# Patient Record
Sex: MALE
[De-identification: names, ages, dates, MRNs, and addresses within clinical notes are randomized per-mention and may not be internally consistent; named-entity substitution may affect disease eponyms.]

---

## 2022-05-29 ENCOUNTER — HOSPITAL ENCOUNTER (INPATIENT)
Dept: HOSPITAL 97 - ER | Age: 67
LOS: 2 days | Discharge: HOME | DRG: 812 | End: 2022-05-31
Attending: INTERNAL MEDICINE | Admitting: INTERNAL MEDICINE
Payer: COMMERCIAL

## 2022-05-29 VITALS — BODY MASS INDEX: 31 KG/M2

## 2022-05-29 DIAGNOSIS — I65.29: ICD-10-CM

## 2022-05-29 DIAGNOSIS — Z79.01: ICD-10-CM

## 2022-05-29 DIAGNOSIS — I25.119: ICD-10-CM

## 2022-05-29 DIAGNOSIS — E11.42: ICD-10-CM

## 2022-05-29 DIAGNOSIS — D62: Primary | ICD-10-CM

## 2022-05-29 DIAGNOSIS — I73.9: ICD-10-CM

## 2022-05-29 DIAGNOSIS — Z86.73: ICD-10-CM

## 2022-05-29 DIAGNOSIS — D69.6: ICD-10-CM

## 2022-05-29 DIAGNOSIS — I25.2: ICD-10-CM

## 2022-05-29 DIAGNOSIS — I48.19: ICD-10-CM

## 2022-05-29 DIAGNOSIS — Z95.1: ICD-10-CM

## 2022-05-29 DIAGNOSIS — Z20.822: ICD-10-CM

## 2022-05-29 DIAGNOSIS — E86.9: ICD-10-CM

## 2022-05-29 DIAGNOSIS — E11.319: ICD-10-CM

## 2022-05-29 DIAGNOSIS — E78.5: ICD-10-CM

## 2022-05-29 DIAGNOSIS — I10: ICD-10-CM

## 2022-05-29 LAB
BUN BLD-MCNC: 33 MG/DL (ref 7–18)
GLUCOSE SERPLBLD-MCNC: 215 MG/DL (ref 74–106)
HCT VFR BLD CALC: 25.3 % (ref 39.6–49)
LYMPHOCYTES # SPEC AUTO: 1 K/UL (ref 0.7–4.9)
PMV BLD: 9.1 FL (ref 7.6–11.3)
POTASSIUM SERPL-SCNC: 3.9 MMOL/L (ref 3.5–5.1)
RBC # BLD: 2.75 M/UL (ref 4.33–5.43)
TROPONIN I SERPL HS-MCNC: 22.7 PG/ML (ref ?–58.9)

## 2022-05-29 PROCEDURE — 93005 ELECTROCARDIOGRAM TRACING: CPT

## 2022-05-29 PROCEDURE — 82747 ASSAY OF FOLIC ACID RBC: CPT

## 2022-05-29 PROCEDURE — 83880 ASSAY OF NATRIURETIC PEPTIDE: CPT

## 2022-05-29 PROCEDURE — 96374 THER/PROPH/DIAG INJ IV PUSH: CPT

## 2022-05-29 PROCEDURE — 86901 BLOOD TYPING SEROLOGIC RH(D): CPT

## 2022-05-29 PROCEDURE — 80048 BASIC METABOLIC PNL TOTAL CA: CPT

## 2022-05-29 PROCEDURE — 82728 ASSAY OF FERRITIN: CPT

## 2022-05-29 PROCEDURE — 36415 COLL VENOUS BLD VENIPUNCTURE: CPT

## 2022-05-29 PROCEDURE — 86850 RBC ANTIBODY SCREEN: CPT

## 2022-05-29 PROCEDURE — 82607 VITAMIN B-12: CPT

## 2022-05-29 PROCEDURE — 82274 ASSAY TEST FOR BLOOD FECAL: CPT

## 2022-05-29 PROCEDURE — 83540 ASSAY OF IRON: CPT

## 2022-05-29 PROCEDURE — 84484 ASSAY OF TROPONIN QUANT: CPT

## 2022-05-29 PROCEDURE — 86900 BLOOD TYPING SEROLOGIC ABO: CPT

## 2022-05-29 PROCEDURE — 85014 HEMATOCRIT: CPT

## 2022-05-29 PROCEDURE — 85025 COMPLETE CBC W/AUTO DIFF WBC: CPT

## 2022-05-29 PROCEDURE — 82947 ASSAY GLUCOSE BLOOD QUANT: CPT

## 2022-05-29 PROCEDURE — 85018 HEMOGLOBIN: CPT

## 2022-05-29 PROCEDURE — 99285 EMERGENCY DEPT VISIT HI MDM: CPT

## 2022-05-29 PROCEDURE — 71045 X-RAY EXAM CHEST 1 VIEW: CPT

## 2022-05-29 PROCEDURE — 84466 ASSAY OF TRANSFERRIN: CPT

## 2022-05-29 RX ADMIN — Medication SCH ML: at 21:00

## 2022-05-29 NOTE — EDPHYS
Physician Documentation                                                                           

 [facility]                                                                 

Name: Milton Verdugo                                                                            

Age: 66 yrs                                                                                       

Sex: Male                                                                                         

: 1955                                                                                   

MRN: [id number]                                                                                   

Arrival Date: 2022                                                                          

Time: 16:50                                                                                       

Account#: [id number]                                                                            

Bed 8                                                                                             

Private MD: COLEEN Chacon C                                                                            

ED Physician Jovan Otto                                                                       

HPI:                                                                                              

                                                                                             

17:15 This 66 yrs old  Male presents to ER via Wheelchair with complaints of          cp  

      Shortness Of Breath, General Weakness.                                                      

17:15 The patient has shortness of breath with light activity. Onset: The symptoms/episode    cp  

      began/occurred 6 day(s) ago. Duration: The symptoms are intermittent, with light            

      activity. Associated signs and symptoms: Pertinent positives: chest pain, general           

      weakness, Pertinent negatives: diaphoresis, dizziness, fever. Severity of symptoms: in      

      the emergency department the symptoms are unchanged despite home interventions.             

                                                                                                  

Historical:                                                                                       

- Allergies:                                                                                      

17:03 PENICILLINS;                                                                            ld1 

- PMHx:                                                                                           

17:03 Diabetes - NIDDM; High Cholesterol; Prostate; Hypertension;                             ld1 

                                                                                                  

- Immunization history:: Adult Immunizations up to date, Client reports having NOT                

  received the Covid vaccine.                                                                     

- Social history:: Smoking status: Patient denies any tobacco usage or history of.                

  Patient/guardian denies using alcohol.                                                          

                                                                                                  

                                                                                                  

ROS:                                                                                              

17:20 Constitutional: Negative for body aches, chills, fever, poor PO intake.                 cp  

17:20 Eyes: Negative for injury, pain, redness, and discharge.                                cp  

17:20 Cardiovascular: Positive for chest pain.                                                cp  

17:20 Neck: Negative for pain with movement, pain at rest, stiffness.                         cp  

17:20 Respiratory: Positive for shortness of breath, on exertion. Negative for cough,             

      wheezing.                                                                                   

17:20 Abdomen/GI: Negative for abdominal pain, vomiting, diarrhea, constipation.                  

17:20 Back: Negative for pain at rest, pain with movement.                                        

17:20 : Negative for urinary symptoms.                                                          

17:20 Neuro: Positive for weakness, Negative for altered mental status, dizziness, headache,      

      syncope.                                                                                    

17:20 All other systems are negative.                                                             

                                                                                                  

Exam:                                                                                             

17:08 ECG was reviewed by the Attending Physician.                                            cp  

17:25 Constitutional: The patient appears in no acute distress, alert, awake,                 cp  

      non-diaphoretic, non-toxic, well developed, well nourished.                                 

17:25 Head/Face:  Normocephalic, atraumatic.                                                  cp  

17:25 Eyes: Periorbital structures: appear normal, Pupils: equal, round, and reactive to          

      light and accomodation, Extraocular movements: intact throughout, Conjunctiva: normal,      

      no exudate, no injection, Sclera: no appreciated abnormality, Lids and lashes: appear       

      normal, bilaterally.                                                                        

17:25 ENT: External ear(s): are unremarkable, Nose: is normal, Mouth: Lips: moist, Oral           

      mucosa: pink and intact, moist, Posterior pharynx: Airway: no evidence of obstruction,      

      patent.                                                                                     

17:25 Neck: ROM/movement: is normal, is supple, without pain, no range of motions limitations.    

17:25 Chest/axilla: Inspection: normal, Palpation: is normal, no crepitus, no tenderness.         

17:25 Cardiovascular: Rate: bradycardic, Rhythm: regular, Edema: is not appreciated, JVD: is      

      not appreciated.                                                                            

17:25 Respiratory: the patient does not display signs of respiratory distress,  Respirations:     

      normal, no use of accessory muscles, no retractions, labored breathing, is not present,     

      Breath sounds: are clear throughout, no decreased breath sounds, no stridor, no             

      wheezing.                                                                                   

17:25 Abdomen/GI: Inspection: abdomen appears normal, Palpation: abdomen is soft and              

      non-tender, in all quadrants, Rectal exam: Stool: brown, guaiac negative.                   

17:25 Back: pain, is absent, ROM is normal.                                                       

17:25 Musculoskeletal/extremity: left lower extremity prosthesis.                                 

17:25 Skin: cellulitis, is not appreciated, no rash present.                                      

17:25 Neuro: Orientation: to person, place \T\ time. Mentation: is normal.                        

                                                                                                  

Vital Signs:                                                                                      

17:00  / 75; Pulse 70; Resp 17; Temp 98.1; Pulse Ox 97% on R/A; Weight 89.81 kg; Height ld1 

      5 ft. 7 in. (170.18 cm); Pain 6/10;                                                         

18:12  / 55; Pulse 64; Resp 18 S; Pulse Ox 99% on R/A;                                  jd3 

19:01  / 59; Pulse 54; Resp 20 S; Pulse Ox 95% on R/A;                                  jd3 

20:00  / 66; Pulse 50; Resp 17; Pulse Ox 98% ;                                          ld1 

21:00  / 53; Pulse 54; Resp 19; Pulse Ox 97% ;                                          ld1 

22:00  / 61; Pulse 49; Resp 18; Pulse Ox 95% ;                                          ld1 

17:00 Body Mass Index 31.01 (89.81 kg, 170.18 cm)                                             ld1 

                                                                                                  

MDM:                                                                                              

17:01 Patient medically screened.                                                             cp  

18:00 Differential diagnosis: CHF exacerbation, pneumonia, Pneumothorax pulmonary edema,      cp  

      Pulmonary Embolism Unstable Angina.                                                         

19:30 Data reviewed: vital signs, lab test result(s), EKG, radiologic studies, plain films.   cp  

19:30 Test interpretation: by ED physician or midlevel provider: ECG, plain radiologic        cp  

      studies.                                                                                    

20:00 Counseling: I had a detailed discussion with the patient and/or guardian regarding: the cp  

      historical points, exam findings, and any diagnostic results supporting the                 

      discharge/admit diagnosis, lab results, radiology results, the need for further work-up     

      and treatment in the hospital.                                                              

20:00 Physician consultation: A Oswaldo BOYLE was called at 20:00, was contacted at 20:00,            

      regarding admission, to the telemetry unit. patient's condition.                            

                                                                                                  

                                                                                             

17:13 Order name: Basic Metabolic Panel; Complete Time: 19:23                                 [facility] 

                                                                                             

19:23 Interpretation: Normal except: GLUC 215; BUN 33; CRE 1.62; GFR 47.                        

                                                                                             

17:13 Order name: CBC with Diff; Complete Time: 19:23                                         [facility] 

                                                                                             

19:23 Interpretation: Normal except: RBC 2.75; HGB 8.3; HCT 25.3; JAMIR% 78.3; LYM% 14.5; BASO% cp  

      1.4.                                                                                        

                                                                                             

17:13 Order name: NT PRO-BNP; Complete Time: 19:23                                            [facility] 

                                                                                             

19:24 Interpretation: Abnormal: NT PRO-BNP 4687.                                                

                                                                                             

17:13 Order name: Troponin HS; Complete Time: 19:23                                           [facility] 

                                                                                             

19:25 Interpretation: Troponin HS 22.7; Reviewed.                                               

                                                                                             

20:22 Order name: Ferritin                                                                    [facility]

                                                                                             

20:22 Order name: Folic Acid, RBC                                                             [facility]

                                                                                             

20:22 Order name: Iron                                                                        [facility]

                                                                                             

20:22 Order name: Transferrin Sat/Iron Binding                                                [facility]

                                                                                             

20:22 Order name: Vitamin B12 Level                                                           [facility]

                                                                                             

20:22 Order name: Basic Metabolic Panel                                                       [facility]

                                                                                             

20:22 Order name: Basic Metabolic Panel                                                       [facility]

                                                                                             

20:22 Order name: CBC with Automated Diff                                                     [facility]

                                                                                             

20:22 Order name: CBC with Automated Diff                                                     [facility]

                                                                                             

21:03 Order name: COVID-19 SARS RT PCR (Document "Date of Onset" if Symptomatic)              vc1 

                                                                                             

17:13 Order name: XRAY Chest (1 view); Complete Time: 19:23                                   [facility] 

                                                                                             

17:13 Order name: EKG; Complete Time: 17:14                                                   [facility] 

                                                                                             

17:13 Order name: Cardiac monitoring; Complete Time: 17:28                                    [facility] 

                                                                                             

17:13 Order name: EKG - Nurse/Tech; Complete Time: 17:28                                                                                                                                   

17:13 Order name: IV Saline Lock; Complete Time: 18:20                                        [facility] 

                                                                                             

17:13 Order name: Labs collected and sent; Complete Time: 17:44                               [facility] 

                                                                                             

20:22 Order name: EKG Electrocardiogram                                                       [facility]

                                                                                             

20:22 Order name: EKG Electrocardiogram                                                       [facility]

                                                                                             

20:22 Order name: EKG Electrocardiogram                                                       [facility]

                                                                                             

20:22 Order name: EKG Electrocardiogram                                                       [facility]

                                                                                             

20:22 Order name: EKG Electrocardiogram                                                       [facility]

                                                                                             

21:10 Order name: Troponin High Sensitivity                                                   [facility]

                                                                                             

21:10 Order name: Troponin High Sensitivity                                                   [facility]

                                                                                             

21:10 Order name: Troponin High Sensitivity                                                   [facility]

                                                                                             

21:10 Order name: Troponin High Sensitivity                                                   [facility]

                                                                                             

17:13 Order name: O2 Per Protocol; Complete Time: 17:28                                       [facility] 

                                                                                             

17:13 Order name: O2 Sat Monitoring; Complete Time: 17:28                                      

                                                                                                  

EC:08 Rate is 59 beats/min. Rhythm is irregular. QRS interval is normal. QT interval is       cp  

      normal. T waves are Inverted in lead aVL. Interpreted by me. Reviewed by me.                

                                                                                                  

Administered Medications:                                                                         

20:48 Drug: Lasix (furosemide) 20 mg Route: IVP; Site: left antecubital;                      vc1 

                                                                                                  

                                                                                                  

Disposition Summary:                                                                              

22 20:06                                                                                    

Hospitalization Ordered                                                                           

      Hospitalization Status: Inpatient Admission                                             cp  

      Provider: COLEEN Chacon cp  

      Location: [facility] (Inpatient)                                                 cp  

      Condition: Stable                                                                       cp  

      Problem: new                                                                            cp  

      Symptoms: have improved                                                                 cp  

      Bed/Room Type: Standard                                                                 cp  

      Room Assignment: 232(22 22:34)                                                    cg  

      Diagnosis                                                                                   

        - Angina pectoris, unspecified                                                        cp  

        - Anemia in other chronic diseases classified elsewhere                               cp  

      Forms:                                                                                      

        - Medication Reconciliation Form                                                      cp  

        - SBAR form                                                                           cp  

Addendum:                                                                                         

2022                                                                                        

     07:08 Co-signature as Attending Physician, Jovan Otto MD I agree with the assessment and   k
dr

           plan of care.                                                                          

                                                                                                  

Signatures:                                                                                       

Dispatcher [employer]                           EDMS                                                 

Jovan Otto MD MD   [facility]                                                  

Canelo Nj PA                         PA   cp                                                   

Argentina Verdugo, RN                       RN   cg                                                   

Leanne Anand RN                        RN   jl7                                                  

Susi Javier RN                     RN   ld1                                                  

Ameena Marroquin RN                    RN   vc1                                                  

                                                                                                  

Corrections: (The following items were deleted from the chart)                                    

                                                                                             

20:07 20:06 Iron deficiency anemia, unspecified cp                                            cp  

22:34 20:06 cp                                                                                cg  

                                                                                                  

**************************************************************************************************

## 2022-05-29 NOTE — ER
Nurse's Notes                                                                                     

 [facility]                                                                 

Name: Milton Verdugo                                                                            

Age: 66 yrs                                                                                       

Sex: Male                                                                                         

: 1955                                                                                   

MRN: [id number]                                                                                   

Arrival Date: 2022                                                                          

Time: 16:50                                                                                       

Account#: [id number]                                                                            

Bed 8                                                                                             

Private MD: COLEEN Chacon C                                                                            

Diagnosis: Angina pectoris, unspecified;Anemia in other chronic diseases classified elsewhere     

                                                                                                  

Presentation:                                                                                     

                                                                                             

17:00 Chief complaint: Patient states: SOB and upper back pain since Monday. Gets "winded"    ld1 

      when walking 3 steps. Coronavirus screen: At this time, the client does not indicate        

      any symptoms associated with coronavirus-19. Ebola Screen: No symptoms or risks             

      identified at this time. Initial Sepsis Screen: Does the patient meet any 2 criteria?       

      No. Patient's initial sepsis screen is negative. Does the patient have a suspected          

      source of infection? No. Patient's initial sepsis screen is negative. Risk Assessment:      

      Do you want to hurt yourself or someone else? Patient reports no desire to harm self or     

      others. Onset of symptoms was May 29, 2022.                                                 

17:00 Method Of Arrival: Wheelchair                                                           ld1 

17:00 Acuity: LEE 3                                                                           ld1 

                                                                                                  

Triage Assessment:                                                                                

17:03 General: Appears in no apparent distress. comfortable, Behavior is calm, cooperative,   ld1 

      appropriate for age. Pain: Complains of pain in back Pain does not radiate. EENT: No        

      signs and/or symptoms were reported regarding the EENT system. Neuro: Level of              

      Consciousness is awake, alert, obeys commands, Oriented to person, place, time,             

      situation. Cardiovascular: Capillary refill < 3 seconds Patient's skin is warm and dry.     

      Respiratory: Airway is patent Respiratory effort is even, unlabored. Respiratory:           

      Onset: The symptoms/episode began/occurred suddenly, the patient has mild shortness of      

      breath. GI: Abdomen is flat, non-distended.                                                 

17:03 Respiratory: Onset: The symptoms/episode began/occurred gradually, the patient has mild jd3 

      shortness of breath.                                                                        

                                                                                                  

Historical:                                                                                       

- Allergies:                                                                                      

17:03 PENICILLINS;                                                                            ld1 

- PMHx:                                                                                           

17:03 Diabetes - NIDDM; High Cholesterol; Prostate; Hypertension;                             ld1 

                                                                                                  

- Immunization history:: Adult Immunizations up to date, Client reports having NOT                

  received the Covid vaccine.                                                                     

- Social history:: Smoking status: Patient denies any tobacco usage or history of.                

  Patient/guardian denies using alcohol.                                                          

                                                                                                  

                                                                                                  

Screenin:03 Abuse screen: Denies threats or abuse. Nutritional screening: No deficits noted.        jd3 

      Tuberculosis screening: No symptoms or risk factors identified. Fall Risk Ambulatory        

      Aid- None/Bed Rest/Nurse Assist (0 pts). Gait- Normal/Bed Rest/Wheelchair (0 pts)           

      Mental Status- Oriented to own ability (0 pts). Total Patel Fall Scale indicates No         

      Risk (0-24 pts).                                                                            

                                                                                                  

Assessment:                                                                                       

17:00 General: Appears in no apparent distress. comfortable, Behavior is calm, cooperative,   jd3 

      appropriate for age. Pain: Complains of pain in mid back area Quality of pain is            

      described as aching. Neuro: Sampson Agitation-Sedation Scale (RASS): 0 - Alert and         

      Calm Level of Consciousness is awake, alert, obeys commands, Oriented to person, place,     

      time, situation. Cardiovascular: Heart tones present Capillary refill < 3 seconds           

      Patient's skin is warm and dry. Rhythm is sinus bradycardia. Respiratory: Reports           

      shortness of breath on exertion Airway is patent Respiratory effort is even, unlabored,     

      Respiratory pattern is regular, symmetrical, Breath sounds are clear bilaterally.           

      Denies cough. GI: No signs and/or symptoms were reported involving the gastrointestinal     

      system. : No signs and/or symptoms were reported regarding the genitourinary system.      

      EENT: No signs and/or symptoms were reported regarding the EENT system. Derm: Skin is       

      intact, Skin is dry, Skin is normal, Skin temperature is warm. Musculoskeletal:             

      Circulation, motion, and sensation intact. Range of motion: intact in all extremities.      

18:12 Reassessment: Patient appears in no apparent distress at this time. No changes from     jd3 

      previously documented assessment. Patient and/or family updated on plan of care and         

      expected duration. Pain level reassessed. Patient is alert, oriented x 3, equal             

      unlabored respirations, skin warm/dry/pink.                                                 

19:01 Reassessment: Patient appears in no apparent distress at this time. Patient and/or      jd3 

      family updated on plan of care and expected duration. Pain level reassessed. Patient is     

      alert, oriented x 3, equal unlabored respirations, skin warm/dry/pink.                      

20:00 Reassessment: Patient and/or family updated on plan of care and expected duration. Pain ld1 

      level reassessed. Patient is alert, oriented x 3, equal unlabored respirations, skin        

      warm/dry/pink.                                                                              

21:00 Reassessment: Patient and/or family updated on plan of care and expected duration. Pain ld1 

      level reassessed. Patient is alert, oriented x 3, equal unlabored respirations, skin        

      warm/dry/pink.                                                                              

22:00 Reassessment: Patient and/or family updated on plan of care and expected duration. Pain ld1 

      level reassessed. Patient is alert, oriented x 3, equal unlabored respirations, skin        

      warm/dry/pink.                                                                              

                                                                                                  

Vital Signs:                                                                                      

17:00  / 75; Pulse 70; Resp 17; Temp 98.1; Pulse Ox 97% on R/A; Weight 89.81 kg; Height ld1 

      5 ft. 7 in. (170.18 cm); Pain 6/10;                                                         

18:12  / 55; Pulse 64; Resp 18 S; Pulse Ox 99% on R/A;                                  jd3 

19:01  / 59; Pulse 54; Resp 20 S; Pulse Ox 95% on R/A;                                  jd3 

20:00  / 66; Pulse 50; Resp 17; Pulse Ox 98% ;                                          ld1 

21:00  / 53; Pulse 54; Resp 19; Pulse Ox 97% ;                                          ld1 

22:00  / 61; Pulse 49; Resp 18; Pulse Ox 95% ;                                          ld1 

17:00 Body Mass Index 31.01 (89.81 kg, 170.18 cm)                                             ld1 

                                                                                                  

ED Course:                                                                                        

16:50 Patient arrived in ED.                                                                  am2 

16:50 COLEEN Chacon MD is Private Physician.                                                       am2 

16:53 Isrrael Gordillo RN is Primary Nurse.                                                  jd3 

16:53 Canelo Nj PA is PHCP.                                                                cp  

16:53 Jovan Otto MD is Attending Physician.                                              cp  

17:03 Triage completed.                                                                       ld1 

17:03 Arm band placed on right wrist.                                                         ld1 

17:05 Patient has correct armband on for positive identification. Bed in low position. Call   jd3 

      light in reach. Side rails up X 1. Adult w/ patient. Client placed on continuous            

      cardiac and pulse oximetry monitoring. NIBP monitoring applied. Cardiac monitor on.         

      Pulse ox on. NIBP on.                                                                       

17:11 EKG done, by ED staff, reviewed by Canelo DIALLO.                                       jl7 

17:44 Missed attempt(s): 22 gauge in right forearm. Bleeding controlled, band aid applied,    jd3 

      catheter tip intact. Missed attempt(s): 20 gauge in right antecubital area. Bleeding        

      controlled, band aid applied, catheter tip intact.                                          

17:44 Initial lab(s) drawn, by me, sent to lab.                                               jd3 

18:08 Missed attempt(s): 22 gauge in left antecubital area. Bleeding controlled, band aid     jl7 

      applied, catheter tip intact.                                                               

18:42 XRAY Chest (1 view) In Process Unspecified.                                             EDMS

19:21 Primary Nurse role handed off by Isrrael Gordillo RN eb  

20:06 COLEEN Chacon MD is Hospitalizing Provider.                                                  cp  

20:18 Edwina Uribe, RN is Primary Nurse.                                                     lg3 

21:10 COVID-19 SARS RT PCR (Document "Date of Onset" if Symptomatic) Sent.                    vc1 

23:26 No provider procedures requiring assistance completed. Patient admitted, IV remains in  bb  

      place.                                                                                      

                                                                                                  

Administered Medications:                                                                         

20:48 Drug: Lasix (furosemide) 20 mg Route: IVP; Site: left antecubital;                      vc1 

                                                                                                  

                                                                                                  

Medication:                                                                                       

17:03 VIS not applicable for this client.                                                     jd3 

                                                                                                  

Outcome:                                                                                          

20:06 Decision to Hospitalize by Provider.                                                    cp  

23:26 Admitted to Tele accompanied by tech, room 232, with chart, Report called to  Zuly LEE  bb  

23:26 Condition: stable                                                                           

23:26 Instructed on                                                                               

23:51 Patient left the ED.                                                                    lp1 

                                                                                                  

Signatures:                                                                                       

Dispatcher [employer]                           EDMS                                                 

Ashley Webb RN RN   bb                                                   

Marisabel Martin RN                         RN   lp1                                                  

Canelo Nj PA                         PA   cp                                                   

Leanne Anand RN                        RN   jl7                                                  

Silke Dye                               amIsrrael Bowman, RN                    RN   jYudelka Billingsley Lacie, RN                       RN   lg3                                                  

Susi Javier RN RN   ld1                                                  

Ameena Marroquin RN RN   vc1                                                  

                                                                                                  

**************************************************************************************************

## 2022-05-29 NOTE — RAD REPORT
EXAM DESCRIPTION:  RAD - Chest Single View - 5/29/2022 6:41 pm

 

CLINICAL HISTORY:  DYSPNEA

Chest pain.

 

COMPARISON:  Chest Single View dated 9/22/2017; CHEST PA AND LAT 2 VIEW dated 3/4/2015

 

FINDINGS:  Portable technique limits examination quality.

 

The lungs are grossly clear. The heart is moderately enlarged with sternotomy wires present. No displ
aced fractures.

## 2022-05-29 NOTE — XMS REPORT
Continuity of Care Document

                             Created on:May 29, 2022



Patient:VANDA VERDUGO

Sex:Male

:1955

External Reference #:[id number]





Demographics







                          Address                   [address]



                                                    [place] [address]

 

                          Home Phone                [phone]

 

                          Work Phone                [phone]

 

                          Mobile Phone              [phone]

 

                          Email Address             [address]

 

                          Preferred Language        en

 

                          Marital Status            Unknown

 

                          Oriental orthodox Affiliation     Unknown

 

                          Race                      Unknown

 

                          Additional Race(s)        Unavailable



                                                     or 



                                                    White

 

                          Ethnic Group              Unknown









Author







                          Organization              [facility]

t

 

                          Address                   [address]



                                                    [place] [address]

 

                          Phone                     [phone]









Support







                Name            Relationship    Address         Phone

 

                GHAZAL VERDUGO Employer        Unavailable     [phone]

 

                Corazon Verdugo   Spouse          [address]    [phone]



                                                [place] [address] 

 

                Luz Guevara Child           Unavailable     [phone]









Care Team Providers







                    Name                Role                Phone

 

                    Ashleigh BOYLE, Wondiful A Primary Care Physician [phone]

[phone]

 

                    Linda Monae    Attending Clinician Unavailable

 

                    [id number]              Attending Clinician Unavailable

 

                    BOLIVAR              Attending Clinician Unavailable

 

                    KIANA STUBBS      Attending Clinician Unavailable

 

                    HAILEY                Attending Clinician Unavailable

 

                    KIANA Hameed   Attending Clinician [phone]

 

                    MD HAILEY  R.        Attending Clinician Unavailable

 

                    Doctor Unassigned,  Name Attending Clinician Unavailable

 

                    Linda Monae    Admitting Clinician Unavailable

 

                    [id number]              Admitting Clinician Unavailable

 

                    HAILEY                Admitting Clinician Unavailable

 

                    MD HAILEY  R.        Admitting Clinician Unavailable









Payers







           Payer Name Policy Type Policy Number Effective Date Expiration Date YVETTE dang

 

           AET        AET        [id number]                       

 

           [employer]ET[employer] CHOICE POS II            [id number]  [id number]            



                                            00:00:00              

 

           [employer]CIAL            [id number] 2019            



           OUT OF NETWORK                       00:00:00              







Problems







       Condition Condition Condition Status Onset  Resolution Last   Treating Co

mments 

Source



       Name   Details Category        Date   Date   Treatment Clinician        



                                                 Date                 

 

       Amputation Amputation Disease Active                              U

T



       below knee below knee               9                               He

alth



                                   00:00:                             



                                   00                                 

 

       PAOD   PAOD   Disease Active                              [facility]



       (periphera (periphera               907                               He

alth



       l arterial l arterial               00:00:                             



       occlusive occlusive               00                                 



       disease) disease)                                                  

 

       PAD    PAD    Disease Active                              [facility]



       (periphera (periphera               7-30                               He

alth



       l artery l artery               00:00:                             



       disease) disease)               00                                 

 

       S/P BKA S/P BKA Disease Active                              [facility]



       (below (below               7-20                               ity of



       knee   knee                 00:00:                             [place]



       amputation amputation               00                                 Me

dical



       ), left ), left                                                  Branch

 

       Infection Infection Disease Active                              Uni

vers



                                   6-01                               ity of



                                   00:00:                             [place]



                                   00                                 Medical



                                                                      Branch

 

       Other  Other  Disease Active                              Univers



       diseases diseases               2-09                               ity of



       of nasal of nasal               00:00:                             [place]



       cavity and cavity and               00                                 Me

dical



       sinuses sinuses                                                  Branch

 

       Mitral Mitral Disease Active                              [facility]



       late   late                 2-09                               ity of



       systolic systolic               00:00:                             [place]



       murmur murmur               00                                 Medical



                                                                      Branch

 

       Primary Primary Disease Active                              [facility]



       hypertensi hypertensi               2-09                               it

y of



       on     on                   00:00:                             [place]



                                   00                                 Medical



                                                                      Branch

 

       Occlusion Occlusion Disease Active                              Uni

vers



       and    and                  2-09                               ity of



       stenosis stenosis               00:00:                             [place]



       of carotid of carotid               00                                 Me

dical



       artery artery                                                  Branch

 

       Hearing Hearing Disease Active                              Univers



       loss   loss                 2-09                               ity of



                                   00:00:                             [place]



                                   00                                 Medical



                                                                      Branch

 

       Diabetic Diabetic Disease Active                              Unive

rs



       peripheral peripheral               2-09                               it

y of



       neuropathy neuropathy               00:00:                             [place]

[place]



                                   00                                 Medical



                                                                      Branch

 

       Osteomyeli Osteomyeli Disease Active                              U

nivers



       tis    tis                  2-01                               ity of



                                   00:00:                             [place]



                                   00                                 Medical



                                                                      Branch

 

       Wound  Wound  Disease Active                       Overview: Univer

s



       dehiscence dehiscence               2-01                        Formattin

 ity of



                                   00:00:                      g of this [place]



                                   00                          note   Medical



                                                               might be Branch



                                                               different 



                                                               from the 



                                                               original. 



                                                               Added  



                                                               automatic 



                                                               ally from 



                                                               request 



                                                               for    



                                                               surgery 



                                                               [id number] 

 

       S/P PICC S/P PICC Disease Active                              Unive

rs



       central central               1-15                               ity of



       line   line                 00:00:                             [place]



       placement placement               00                                 Medi

shmuel



                                                                      Branch

 

       Osteomyeli Osteomyeli Disease Active                       Overview

: Univers



       tis of tis of               1-11                        Added  ity of



       metatarsal metatarsal               00:00:                      automatic

 [place]



                                   00                          ally from Medical



                                                               request Branch



                                                               for    



                                                               surgery 



                                                               [id number] 

 

       Left foot Left foot Disease Active                              Uni

vers



       infection infection               1-11                               ity 

of



                                   00:00:                             [place]



                                   00                                 Medical



                                                                      Branch

 

       Post-opera Post-opera Disease Active 2020-1                             U

nivers



       tive   tive                 2-19                               ity of



       complicati complicati               00:00:                             [facility]

[place]



       on     on                   00                                 Medical



                                                                      Branch

 

       S/P    S/P    Disease Active 2020                             Univers



       ablation ablation               2-14                               ity of



       of atrial of atrial               00:00:                             [place]

[place]



       fibrillati fibrillati               00                                 Me

dical



       on     on                                                      Branch

 

       Peripheral Peripheral Disease Active 2020                      Overview

: Univers



       vascular vascular               2-07                        Formattin ity

 of



       disease, disease,               00:00:                      g of this [place]

as



       unspecifie unspecifie               00                          note   Me

dical



       d      d                                                might be Branch



                                                               different 



                                                               from the 



                                                               original. 



                                                               Added  



                                                               automatic 



                                                               ally from 



                                                               request 



                                                               for    



                                                               surgery 



                                                               [id number] 

 

       Ischemic Ischemic Disease Active 2020                             Unive

rs



       ulcer of ulcer of               0-30                               ity of



       toe of toe of               00:00:                             [place]



       left foot left foot               00                                 Medi

shmuel



                                                                      Branch

 

       Ischemic Ischemic Disease Active 2020                      Overview: Un

chris



       ulcer of ulcer of               0-29                        Added  ity of



       toe of toe of               00:00:                      automatic [place]



       left foot left foot               00                          ally from BESSIE haas



       with   with                                             request Branch



       necrosis necrosis                                           for    



       of muscle of muscle                                           surgery 



                                                               [id number] 

 

       Toe    Toe    Disease Active 2020                      Overview: Univer

s



       osteomyeli osteomyeli               0-29                        Added  it

y of



       tis, left tis, left               00:00:                      automatic [place]

[place]



                                   00                          ally from Medical



                                                               request Branch



                                                               for    



                                                               surgery 



                                                               [id number] 

 

       Thrombus Thrombus Disease Active                              Unive

rs



                                   3-10                               ity of



                                   00:00:                             [place]



                                   00                                 Medical



                                                                      Branch

 

       Status Status Disease Active                              Univers



       post left post left               3-09                               ity 

of



       heart  heart                00:00:                             [place]



       catheteriz catheteriz               00                                 Me

dical



       ation  ation                                                   Branch



       ([facility]) by ([facility]) by                                                  



       percutaneo percutaneo                                                  



       us     us                                                      



       approach approach                                                  

 

       Acquired Acquired Disease Active 2019                             Unive

rs



       renal cyst renal cyst               1-12                               it

y of



       of right of right               00:00:                             [place]



       kidney kidney               00                                 Medical



                                                                      Branch

 

       Acquired Acquired Disease Active 2019                             Unive

rs



       renal cyst renal cyst               1-12                               it

y of



       of right of right               00:00:                             [place]



       kidney kidney               00                                 Medical



                                                                      Branch

 

       Renal  Renal  Disease Active 2019                             [facility]



       dysfunctio dysfunctio               0-31                               it

y of



       n      n                    00:00:                             [place]



                                   00                                 Medical



                                                                      Branch

 

       Intestinal Intestinal Disease Active                       Overview

: Univers



       metaplasia metaplasia               7-30                        Formattin

 ity of



       of gastric of gastric               00:00:                      g of this

 [place]



       mucosa mucosa               00                          note   Medical



                                                               might be Branch



                                                               different 



                                                               from the 



                                                               original. 



                                                               Added  



                                                               automatic 



                                                               ally from 



                                                               request 



                                                               for    



                                                               surgery 



                                                               [id number] 

 

       PAD    PAD    Disease Active 2018                             [facility]



       (periphera (periphera               0-30                               it

y of



       l artery l artery               00:00:                             [place]



       disease) disease)               00                                 Medica

l



                                                                      Branch

 

       Proteinuri Proteinuri Disease Active                              U

nivers



       a      a                    8-04                               ity of



                                   00:00:                             [place]



                                   00                                 Medical



                                                                      Branch

 

       History of History of Disease Active                       Overview

: Univers



       colon  colon                5-03                        Formattin ity of



       polyps polyps               00:00:                      g of this [place]



                                   00                          note   Medical



                                                               might be Branch



                                                               different 



                                                               from the 



                                                               original. 



                                                               Added  



                                                               automatic 



                                                               ally from 



                                                               request 



                                                               for    



                                                               surgery 



                                                               [id number] 

 

       Hepatomega Hepatomega Disease Active                       Overview

: Univers



       ly     ly                   4-30                        Formattin ity of



                                   00:00:                      g of this [place]



                                   00                          note   Medical



                                                               might be Branch



                                                               different 



                                                               from the 



                                                               original. 



                                                               Mild per 



                                                               US     

 

       S/P CABG x S/P CABG x Disease Active                              U

nivers



       4      4                    4-08                               ity of



                                   00:00:                             [place]



                                   00                                 Medical



                                                                      Branch

 

       Anemia Anemia Disease Active                              Univers



                                   4-03                               ity of



                                   00:00:                             [place]



                                   00                                 Medical



                                                                      Branch

 

       Chest pain Chest pain Disease Active                              U

nivers



                                   3-04                               ity of



                                   00:00:                             [place]



                                   00                                 Medical



                                                                      Branch

 

       Chronic Chronic Disease Active                              Univers



       diastolic diastolic               3-04                               ity 

of



       congestive congestive               00:00:                             Te

[place]



       heart  heart                00                                 Medical



       failure failure                                                  Branch

 

       PAF    PAF    Disease Active                              Univers



       (paroxysma (paroxysma               2-07                               it

y of



       l atrial l atrial               00:00:                             [place]



       fibrillati fibrillati               00                                 Me

dical



       on)    on)                                                     Branch

 

       DEN    DEN    Disease Active                              Univers



       (obstructi (obstructi               3-15                               it

y of



       ve sleep ve sleep               00:00:                             [place]



       apnea) apnea)               00                                 Medical



                                                                      Branch

 

       Coronary Coronary Disease Active 2015                             Unive

rs



       artery artery               2-08                               ity of



       disease disease               00:00:                             [place]



                                   00                                 Medical



                                                                      Branch

 

       Mixed  Mixed  Disease Active 2015                             Univers



       hyperlipid hyperlipid               2-08                               it

y of



       emia   emia                 00:00:                             [place]



                                   00                                 Medical



                                                                      Branch

 

       Diabetes Diabetes Disease Active 2015                             Un[facility]

rs



       mellitus mellitus               2-08                               ity of



                                   00:00:                             [place]



                                   00                                 Medical



                                                                      Branch

 

       Dyspnea Dyspnea Disease Active 2015                             [facility]



                                   2-08                               ity of



                                   00:00:                             [place]



                                   00                                 Medical



                                                                      Branch

 

       Bilateral Bilateral Disease Active 2015                             Uni

vers



       carotid carotid               2-08                               ity of



       artery artery               00:00:                             [place]



       disease disease               00                                 Medical



                                                                      Branch

 

       Obesity Obesity Disease Active 2015                             [facility]



                                                                  ity of



                                   00:00:                             [place]



                                   00                                 Medical



                                                                      Branch

 

       B12    B12    Disease Active                                    [facility]



       deficiency deficiency                                                  it

y of



                                                                      [facility]







Allergies, Adverse Reactions, Alerts







       Allergy Allergy Status Severity Reaction(s) Onset  Inactive Treating Comm

ents 

Source



       Name   Type                        Date   Date   Clinician        

 

       Waldemar Propensi Active                                     [facility]



       ins    ty to                                               Health



              adverse                      00:00:                      



              reaction                      00                          



              s                                                       

 

       PENICILL DRUG   Active        Anaphylaxis 2015                      Uni

vers



       IN     INGREDI                                              ity of



                                          00:00:                      [place]



                                          00                          Medical



                                                                      Branch

 

       Penicill Propensi Active        Anaphylaxis 2015               Patient 

[facility]



       in     ty to                                        reports ity of



              adverse                      00:00:               reaction [place]



              reaction                      00                   was bad. Medica

l



              s                                                Was given [facility]



                                                               Cefepime 



                                                               without 



                                                               adverse 



                                                               reaction 







Social History







           Social Habit Start Date Stop Date  Quantity   Comments   Source

 

           Exposure to                       Not sure              [facility]



           SARS-CoV-2 (event)                                             [facility]

 

           History of tobacco                       Cigarette Smoker            

[facility]



           use                                                    [facility]

 

           Alcohol intake 2022 0 /d                  [facility]

 of



                      00:00:00   00:00:00                         [facility]

 

           Tobacco Comment 2020 quit 1996             [facility]

y of



                      00:00:00   00:00:00                         [facility]

 

           Cigarettes smoked 2015                       [facility]

ity of



           current (pack per 00:00:00   00:00:00                         [place] [facility]

edical



           day) - Reported                                             Branch

 

           Cigarette  2015                       [facility]



           pack-years 00:00:00   00:00:00                         [facility]

 

           Tobacco use and 2015 Never used            [facility]

y of



           exposure   00:00:00   00:00:00                         [facility]

 

           Sex Assigned At [birth year]                       [facility]

y of



           Birth      00:00:00   00:00:00                         [facility]









                Smoking Status  Start Date      Stop Date       Source

 

                Former smoker   2015 00:00:00 2015 00:00:00 [facility]

ty of [facility]







Medications







       Ordered Filled Start  Stop   Current Ordering Indication Dosage Frequency

 Signature

                    Comments            Components          Source



     Medication Medication Date Date Medication? Clinician                (SIG) 

          



     Name Name                                                   

 

     lisinopriL            Yes            20mg      Take 1           Unive

rs



     (PRINIVIL)      1-18                               tablet by           ity 

of



     20 mg      00:00:                               mouth           [place]



     tablet      00                                 daily.           Medical



                                                  Please           Branch



                                                  note           



                                                  tablet           



                                                  dose           



                                                  change.           

 

     spironolact            Yes       [id number] 12.5mg      Take 0.5       

    [facility]



     one 25 mg      1-04                               tablets by           ity 

of



     tablet      00:00:                               mouth           [place]



               00                                 daily.           Medical



                                                                 Branch

 

     gabapentin      2021      Yes            300mg      Take 300           Un

chris



     300 mg      1-08                               mg by           ity of



     capsule      13:35:                               mouth.           [place]



               36                                                Medical



                                                                 Branch

 

     rivaroxaban      2021      Yes                      1 (one)           Uni

vers



     20 mg      1-01                               time each           ity of



     tablet      00:00:                               day at the           [place]



               00                                 same time.           Medical



                                                                 Branch

 

     clopidogrel            Yes                      1 (one)           [facility]



     (Plavix) 75      9-07                               time each           Hea

lth



     MG tablet      17:25:                               day at the           



               23                                 same time.           

 

     rivaroxaban            Yes                      1 (one)           [facility]



     (Xarelto)      907                               time each           Healt

h



     20 MG      17:25:                               day at the           



     tablet      23                                 same time.           

 

     clopidogrel            Yes                      1 (one)           [facility]



     (Plavix) 75      9-07                               time each           Hea

lth



     MG tablet      17:25:                               day at the           



               23                                 same time.           

 

     rivaroxaban            Yes                      1 (one)           [facility]



     (Xarelto)      9-07                               time each           Healt

h



     20 MG      17:25:                               day at the           



     tablet      23                                 same time.           

 

     clopidogrel            Yes                      1 (one)           [facility]



     (Plavix) 75      9-07                               time each           Hea

lth



     MG tablet      17:25:                               day at the           



               23                                 same time.           

 

     rivaroxaban            Yes                      1 (one)           [facility]



     (Xarelto)      907                               time each           Healt

h



     20 MG      17:25:                               day at the           



     tablet      23                                 same time.           

 

     Insulin            Yes       [id number]           Use as           Univ

ers



     [place],      7-                               directed           ity of



     Disposable,      00:00:                               BId E11.65           

[place]



     (PEN      00                                                Medical



     NEEDLE) 32                                                        Branch



     gauge x                                                        



     5/32" Ndle                                                        

 

     blood sugar            Yes       [id number]           Use to           

Univers



     diagnostic      -                               check           ity of



     (ACCU-CHEK      00:00:                               blood           [place]



     URIEL PLUS      00                                 sugar 3X           Medic

al



     TEST STRP)                                         daily.           Branch



     strip                                         DX:[id number]           

 

     lancets 33            Yes       [id number]           Use to           U

nivers



     gauge Misc      7-19                               check           ity of



               00:00:                               blood           [place]



               00                                 sugar 3X           Medical



                                                  daily.           Branch



                                                  DX:E11.42           

 

     metoprolol            Yes       [id number] 50mg      Take 1         

  [facility]



     succinate      7-06                100            tablet by           ity o

f



     XL 50 mg 24      00:00:                               mouth 2           [facility]

as



     hr tablet      00                                 (two)           Medical



                                                  times           Branch



                                                  daily.           

 

     aspirin 81      0      Yes            81mg      Take 1           Unive

rs



     mg chewable      6-23                               tablet by           ity

 of



     tablet      00:00:                               mouth           [place]



               00                                 daily.           Medical



                                                                 Branch

 

     aspirin            Yes                      1 (one)           [facility]



     (ASPIRIN)      6-23                               time each           Healt

h



     81 MG      00:00:                               day at the           



     chewable      00                                 same time.           



     tablet                                                        

 

     aspirin      0      Yes                      1 (one)           [facility]



     (ASPIRIN)      6-23                               time each           Healt

h



     81 MG      00:00:                               day at the           



     chewable      00                                 same time.           



     tablet                                                        

 

     aspirin      0      Yes                      1 (one)           [facility]



     (ASPIRIN)      6-23                               time each           Healt

h



     81 MG      00:00:                               day at the           



     chewable      00                                 same time.           



     tablet                                                        

 

     mirtazapine            Yes            15mg      Take 1           Univ

ers



     15 mg      6-22                               tablet by           ity of



     tablet      00:00:                               mouth at           [place]



               00                                 bedtime.           Medical



                                                                 Branch

 

     HYDROcodone            Yes       4647 1{tbl}      Take 1           Un

chris



     -acetaminop      6-22                               tablet by           ity

 of



     hen       00:00:                               mouth           [place]



     mg tablet      00                                 every 6           Medical



                                                  (six)           Branch



                                                  hours as           



                                                  needed for           



                                                  Pain           



                                                  (scale           



                                                  4-6).           



                                                  Indication           



                                                  s: acute           



                                                  pain           

 

     Insulin            Yes       [id number]           INJECT 30           

Univers



     Detemir      5-05                               UNITS           ity of



     (LEVEMIR      00:00:                               SUBCUTANEO           Daniel

as



     FLEXTOUCH      00                                 USLY EVERY           Medi

shmuel



     U-100                                         MORNING           Branch



     INSULN) 100                                         AND 15           



     unit/mL (3                                         UNITS           



     mL)                                          EVERY           



     injection                                         EVENING           

 

     CLOPIDOGREL            Yes       Status post           TAKE 1        

   [facility]



     75 mg      4-22                left heart           TABLET BY           ity

 of



     tablet      00:00:                catheteriza           MOUTH           Daniel

as



               00                  tion (LHC)           EVERY DAY           Medi

shmuel



                                   by                            Branch



                                   percutaneou                          



                                   s approach                          

 

     dulaglutide            Yes       Type 2 1.5mg      inject 1.5        

   [facility]



     (TRULICITY)      3-31                diabetes           mg under           

ity of



     1.5 mg/0.5      00:00:                mellitus           the skin          

 [place]



     mL PnIj      00                  with           weekly.           Medical



                                   peripheral                          Branch



                                   neuropathy                          

 

     metformin      0      Yes       Type 2 1000mg      Take 2           Un

chris



      mg      3-31                diabetes           tablets by           

ity of



     24 hr      00:00:                mellitus           mouth 2           [place]



     tablet      00                  with           (two)           Medical



                                   peripheral           times           Branch



                                   neuropathy           daily.           

 

     dulaglutide            Yes       [id number] 1.5mg      inject 1.5      

     [facility]



     (TRULICITY)      3-31                               mg under           ity 

of



     1.5 mg/0.5      00:00:                               the skin           Daniel

as



     mL PnIj      00                                 weekly.           Medical



                                                                 Branch

 

     metformin      -0      Yes       [id number] 1000mg      Take 2           

[facility]



      mg      3-31                               tablets by           ity 

of



     24 hr      00:00:                               mouth 2           [place]



     tablet      00                                 (two)           Medical



                                                  times           Branch



                                                  daily.           

 

     atorvastati            Yes            80mg      Take 80 mg           

[facility]



     n (Lipitor)      3-15                               by mouth           Heal

th



     80 MG      00:00:                               every           



     tablet      00                                 night.           

 

     atorvastati            Yes            80mg      Take 80 mg           

[facility]



     n (Lipitor)      3-15                               by mouth           Heal

th



     80 MG      00:00:                               every           



     tablet      00                                 night.           

 

     atorvastati      -0      Yes            80mg      Take 80 mg           

[facility]



     n (Lipitor)      3-15                               by mouth           Heal

th



     80 MG      00:00:                               every           



     tablet      00                                 night.           

 

     lisinopriL            Yes            20mg      Take 1           Unive

rs



     (PRINIVIL)      2-23                               tablet by           ity 

of



     20 mg      00:00:                               mouth           [place]



     tablet      00                                 daily.           Medical



                                                  Please           Branch



                                                  note           



                                                  tablet           



                                                  dose           



                                                  change.           

 

     pantoprazol      2021- No        S/P PICC 40mg      Take 1          

 [facility]



     e 40 mg EC                 central           tablet by           

ity of



     tablet      00:00: 04:59           line           mouth           [place]



               00   :00            placement           daily for           Medic

al



                                                  180 days.           Branch

 

     gabapentin      2021- No        S/P PICC 300mg      Take 1          

 [facility]



     300 mg                 central           capsule by           ity

 of



     capsule      00:00: 04:59           line           mouth           [place]



               00   :00            placement           every           Medical



                                                  morning           Branch



                                                  and at           



                                                  1200           



                                                  (noon) for           



                                                  180 days.           

 

     acetaminoph      2022- No        S/P PICC 650mg      Take 2         

  [facility]



     en 325 mg                 central           tablets by           

ity of



     tablet      00:00: 05:59           line           mouth           [place]



               00   :00            placement           every 6           Medical



                                                  (six)           Branch



                                                  hours as           



                                                  needed for           



                                                  Pain           



                                                  (scale           



                                                  1-3).           

 

     furosemide       No        Chronic 40mg      Take 1           U

nivers



     40 mg                 heart           tablet by           ity of



     tablet      00:00: 04:59           failure           mouth           [place]



               00   :00            with           every           Medical



                                   preserved           morning           Branch



                                   ejection           and            



                                   fraction           evening           



                                                  for 180           



                                                  days. Take           



                                                  one tablet           



                                                  by mouth           



                                                  in the           



                                                  morning           



                                                  and 1/2           



                                                  (half)           



                                                  tablet by           



                                                  mouth in           



                                                  the            



                                                  evening.           

 

     rivaroxaban      2021- No        atrial 20mg      Take 1           U

nivers



     (XARELTO)                 fibrillatio           tablet by        

   ity of



     20 mg      00:00: 04:59           n              mouth           [place]



     tablet      00   :00                           every           Medical



                                                  evening           Branch



                                                  for 180           



                                                  days.           



                                                  Indication           



                                                  s: atrial           



                                                  fibrillati           



                                                  on             

 

     gabapentin      2021- No        S/P PICC 600mg      Take 1          

 [facility]



     600 mg                 central           tablet by           ity 

of



     tablet      00:00: 04:59           line           mouth at           [place]



               00   :00            placement           bedtime           Medical



                                                  for 180           Branch



                                                  days.           

 

     atorvastati      2020      Yes       Essential 80mg      Take 1          

 [facility]



     n 80 mg      2-17                hypertensio           tablet by           

ity of



     tablet      00:00:                n              mouth at           [place]



               00                                 bedtime.           Medical



                                                                 Branch

 

     atorvastati      2020      Yes       [id number] 80mg      Take 1           

Univers



     n 80 mg      2-17                               tablet by           ity of



     tablet      00:00:                               mouth at           [place]



               00                                 bedtime.           Medical



                                                                 Branch

 

     magnesium      2020      Yes            400mg      Take 400           Uni

vers



     oxide 400      2-15                               mg by           ity of



     mg (241.3      00:00:                               mouth           [place]



     mg        00                                 daily.           Medical



     magnesium)                                                        Branch



     tablet                                                        

 

     magnesium      2020      Yes            400mg      Take 400           Uni

vers



     oxide 400      2-15                               mg by           ity of



     mg (241.3      00:00:                               mouth           [place]



     mg        00                                 daily.           Medical



     magnesium)                                                        Branch



     tablet                                                        

 

     metoprolol      2020      Yes       Status post 50mg      Take 1         

  Univers



     succinate      2-02                left heart           tablet by          

 ity of



     XL 50 mg 24      00:00:                catheteriza           mouth 2       

    [place]



     hr tablet      00                  tion (LHC)           (two)           Med

ical



                                   by             times           Branch



                                   percutaneou           daily.           



                                   s approach                          

 

     SPIRONOLACT      2020      Yes       Essential           TAKE 1/2        

   Univers



     ONE 25 mg      1-06                hypertensio           TABLET BY         

  ity of



     tablet      00:00:                n              MOUTH           [place]



               00                                 EVERY DAY           Medical



                                                                 Branch

 

     Insulin      2020-0      Yes       Type 2           Use as           Univer

s



     [place],      7-21                diabetes           directed           ity

 of



     Disposable,      00:00:                mellitus           BId E11.65       

    [place]



     (PEN      00                  with                          Medical



     NEEDLE) 32                          peripheral                          Bra

nch



     gauge x                          neuropathy                          



     " Ndle                                                        

 

     ferrous      -      Yes       Anemia, 325mg      Take 1           Univ

ers



     sulfate 325      3-13                unspecified           tablet by       

    ity of



     mg (65 mg      00:00:                type           mouth 2           [place]



     iron)      00                                 (two)           Medical



     tablet                                         times           Branch



                                                  daily with           



                                                  meals.           

 

     ferrous      -      Yes       [id number] 325mg      Take 1           Un

chris



     sulfate 325      3-13                               tablet by           ity

 of



     mg (65 mg      00:00:                               mouth 2           [place]



     iron)      00                                 (two)           Medical



     tablet                                         times           Branch



                                                  daily with           



                                                  meals.           

 

     nitroglycer            Yes            .4mg      Place 1           Uni

vers



     in 0.4 mg      4-02                               tablet           ity of



     sublingual      00:00:                               under the           [place]

[place]



     tablet      00                                 tongue           Medical



                                                  every  5           Branch



                                                  (five)           



                                                  minutes as           



                                                  needed for           



                                                  Chest           



                                                  pain.           

 

     nitroglycer            Yes            .4mg      Place 1           Uni

vers



     in 0.4 mg      4-02                               tablet           ity of



     sublingual      00:00:                               under the           [place]

[place]



     tablet      00                                 tongue           Medical



                                                  every  5           Branch



                                                  (five)           



                                                  minutes as           



                                                  needed for           



                                                  Chest           



                                                  pain.           







Immunizations







           Ordered    Filled Immunization Date       Status     Comments   [facility]

e



           Immunization Name Name                                        

 

           SARS-COV-2 COVID-19            2021 Completed             Unive

rsity of



           PFIZER VACCINE            00:00:00                         [facility] [place] [facility]

 

           SARS-COV-2 COVID-19            2021 Completed             Unive

rsity of



           PFIZER VACCINE            00:00:00                         [facility] [place] [facility]

 

           SARS-COV-2 COVID-19            2021 Completed             Unive

rsity of



           PFIZER VACCINE            00:00:00                         [facility] [place] [facility]

 

           SARS-COV-2 COVID-19            2021 Completed             Unive

rsity of



           PFIZER VACCINE            00:00:00                         [facility] [place] [facility]

 

           SARS-COV-2 COVID-19            2021 Completed             Unive

rsity of



           PFIZER VACCINE            00:00:00                         [facility] [place] [facility]

 

           SARS-COV-2 COVID-19            2021 Completed             Unive

rsity of



           PFIZER VACCINE            00:00:00                         [facility] [place] [facility]

 

           SARS-COV-2 COVID-19            2021 Completed             Unive

rsity of



           PFIZER VACCINE            00:00:00                         [facility] [place] [facility]

 

           TDAP                  2020-08-10 Completed             University of



                                 00:00:00                         [facility]

 

           Pneumococcal            2020-08-10 Completed             University o

f



           Polysaccharide,            00:00:00                         [place] Med

ical



           PPSV23 (PNEUMOVAX)                                             Branch

 

           TDAP                  2020-08-10 Completed             University of



                                 00:00:00                         [facility]

 

           Pneumococcal            2020-08-10 Completed             University o

f



           Polysaccharide,            00:00:00                         [place] Med

ical



           PPSV23 (PNEUMOVAX)                                             Branch

 

           Influenza Virus            2019-10-24 Completed             Universit

y of



           Vaccine Quad .5 mL            00:00:00                         [facility]



           IM 6+ MO                                               Branch

 

           Influenza Virus            2019-10-24 Completed             Universit

y of



           Vaccine Quad .5 mL            00:00:00                         [facility]



           IM 6+ MO                                               Branch

 

           Influenza Virus            2018 Completed             Universit

y of



           Vaccine Quad IM 3+            00:00:00                         [facility]

 

           Influenza Virus            2018 Completed             Universit

y of



           Vaccine Quad IM 3+            00:00:00                         [facility]

 

           TDAP                  2017 Completed             University of



                                 00:00:00                         [facility]

 

           TDAP                  2017 Completed             University of



                                 00:00:00                         [facility]

 

           Influenza Virus            2016 Completed             Universit

y of



           Vaccine               00:00:00                         [facility]

 

           Influenza Virus            2016 Completed             Universit

y of



           Vaccine (3+ yrs)            00:00:00                         [facility]

 

           Influenza Virus            2016 Completed             Universit

y of



           Vaccine               00:00:00                         [facility]

 

           Influenza Virus            2016 Completed             Universit

y of



           Vaccine (3+ yrs)            00:00:00                         [facility]

 

           Tetanus/Diptheria            2013-10-31 Completed             Univers

ity of



                                 00:00:00                         [facility]

 

           Tetanus/Diptheria            2013-10-31 Completed             Univers

ity of



                                 00:00:00                         [facility]

 

           Zoster(Zostavax)(            2013 Completed             Unive

rsity of



           ingles)               00:00:00                         [facility]

 

           Pneumococcal 13            2013 Completed             Universit

y of



           Conjugate, PCV13            00:00:00                         [place] Me

dical



           (Prevnar 13)                                             [place]

 

           Zoster(Zostavax)(            2013 Completed             Unive

rsity of



           ingles)               00:00:00                         [facility]

 

           Pneumococcal 13            2013 Completed             Universit

y of



           Conjugate, PCV13            00:00:00                         [place] Me

dical



           (Prevnar 13)                                             Branch







Vital Signs







             Vital Name   Observation Time Observation Value Comments     Source

 

             Systolic blood 2022 16:40:00 144 mm[Hg]                Univer

sity of



             pressure                                            [facility]

 

             Diastolic blood 2022 16:40:00 80 mm[Hg]                 Unive

rsity of



             [facility]

 

             Heart rate   2022 16:33:00 65 /min                   [facility]

 

             Body temperature 2022 16:33:00 36.72 Alfreda                 [facility]

 

             Body height  2022 16:33:00 170.2 cm                  [facility]

 

             Body weight  2022 16:33:00 94.031 kg                 [facility]

 

             BMI          2022 16:33:00 32.47 kg/m2               [facility]

 

             Oxygen saturation in 2022 16:33:00 100 /min                  

[facility]



             Arterial blood by                                        [place] Medi

shmuel



             Pulse oximetry                                        Branch







Procedures







                Procedure       Date / Time     Performing Clinician Source



                                Performed                       

 

                PHYSICIAN CORRESPONDENCE 2021 05:01:00 Doctor Unassigned, 

[facility]



                                                [place]         Medical Branch







Plan of Care







             Planned Activity Planned Date Details      Comments     Source

 

             Future Scheduled 2030-08-10   DTaP,Tdap,and Td              [facility]



             Test         00:00:00     Vaccines (3 - Td)              Medical Br

anch



                                       [code = DTaP,Tdap,and              



                                       Td Vaccines (3 - Td)]              

 

             Future Scheduled 2025-08-10   PNEUMOCOCCAL 0-64              Univer

[facility] [place]



             Test         00:00:00     YEARS COMBINED SERIES              Medica

l Branch



                                       (3 of 3 - PPSV23)              



                                       [code = PNEUMOCOCCAL              



                                       0-64 YEARS COMBINED              



                                       SERIES (3 of 3 -              



                                       PPSV23)]                  

 

             Future Scheduled 2022   [facility]              [facility]



             Test         00:00:00     (procedure) [code =              Medical 

Branch



                                       34795550]                 

 

             Future Scheduled 2022   Diabetic foot              [facility] [place]



             Test         00:00:00     examination               Medical Branch



                                       (regime/therapy) [code              



                                       = 411211186]              

 

             Future Scheduled 2021   Depression screening              Uni

[facility]



             Test         00:00:00     (procedure) [code =              Medical 

Branch



                                       405243323]                

 

             Future Scheduled 2021   INFLUENZA VACCINE              [facility]

[facility] [place]



             Test         00:00:00     (Season Ended) [code =              Medic

al Branch



                                       INFLUENZA VACCINE              



                                       (Season Ended)]              

 

             Future Scheduled 2021   Hemoglobin A1c              [facility]



             Test         00:00:00     measurement               Medical Branch



                                       (procedure) [code =              



                                       79080276]                 

 

             Future Scheduled 2021   Screening for              [facility] [place]



             Test         00:00:00     malignant neoplasm of              Medica

l Branch



                                       colon (procedure)              



                                       [code = 133758110]              

 

             Future Scheduled 2021   Screening for              [facility] [place]



             Test         00:00:00     malignant neoplasm of              Medica

l Branch



                                       colon (procedure)              



                                       [code = 929051434]              

 

             Future Scheduled 2021   Calculated low density              [facility]



             Test         00:00:00     lipoprotein               Medical Branch



                                       cholesterol level              



                                       (procedure) [code =              



                                       677758381]                

 

             Future Scheduled 2021   Microalbumin              [facility] 

[facility] [place]



             Test         00:00:00     measurement, urine,              Medical 

Branch



                                       quantitative              



                                       (procedure) [code =              



                                       208140283]                

 

             Future Scheduled 2021   Examination of retina              Un

[facility]



             Test         00:00:00     (procedure) [code =              Medical 

Branch



                                       611013882]                

 

             Future Scheduled 2013-08-15   [facility]oster Recombinant              [facility]

[facility]



             Test         00:00:00     Vaccine (SHINGRIX) (2              Medica

l Branch



                                       of 3) [code = Zoster              



                                       Recombinant Vaccine              



                                       (SHINGRIX) (2 of 3)]              

 

             Future Scheduled 2007-10-15   Screening for occult              Uni

[facility]



             Test         00:00:00     blood in feces              Medical Bran

h



                                       (procedure) [code =              



                                       806302487]                

 

             Future Scheduled 2007-10-15   Stool DNA-based              [facility]

[facility] [place]



             Test         00:00:00     colorectal cancer              Medical Br

anch



                                       screening (procedure)              



                                       [code =                   



                                       396066308897473]              

 

             Future Scheduled 2007-10-15   Flexible fiberoptic              Univ

[facility]



             Test         00:00:00     sigmoidoscopy              Medical Branch



                                       (procedure) [code =              



                                       47254858]                 







Encounters







        Start   End     Encounter Admission Attending Care    Care    Encounter 

Source



        Date/Time Date/Time Type    Type    Clinicians Facility Department ID   

   

 

        2022         Outpatient 3       CORNEL Monae   AML     [id number]

21 [facility]



        12:22:33                         Linda                  0622    

 

        2022         Outpatient 3       [id number]  [facility]   REF     [id number]

 [facility]



        12:22:03                                                 0621    

 

        2021         Outpatient         BOLIVAR [facility]     [id number] 

[place]



        02:58:24                         [facility]

 

        2022 Outpatient R       PEG, [facility]    103

[id number] [facility]



        11:30:00 11:30:00                 ZHENG                         ity [facility]

 

        2022 Outpatient         HART,   [facility]     [id number]

[address]



        00:00:00 00:00:00                 KENDRA                  719     Method

i



                                                                        st

 

        2022 Office          MERA Stubbs [id number] 

[id number] [facility]



        11:30:00 12:25:13 Visit           [facility] [id number]        

 ity [facility] [id number]         [facility]

s



                                                        [phone]         [address]

 

        2022 Outpatient         HAILEY,   [facility]     060     [id number]

[address]



        00:00:00 00:00:00                 KENDRA                  330     Method

i



                                                                        st

 

        2022 Outpatient         HART,   [facility]     [id number]

[address]



        00:00:00 00:00:00                 KENDRA                  207     Method

i



                                                                        st

 

        2022 Outpatient         HART,   [facility]     [id number]

[address]



        00:00:00 00:00:00                 KENDRA                  384     Method

i



                                                                        st

 

        2021-10-29 2021-10-29 Outpatient         HART,   [facility]     021     [id number]

[address]



        00:00:00 00:00:00                 KENDRA                  982     Method

i



                                                                        st

 

        2021-10-25 2021-10-25 Outpatient         HART,   [facility]     [id number]

[address]



        00:00:00 00:00:00                 KENDRA                  968     Method

i



                                                                        st

 

        2021-10-25 2021-10-25 Outpatient         HART,   [facility]     [id number]

[address]



        00:00:00 00:00:00                 KENDRA                  772     Method

i



                                                                        st

 

        2021-10-19 2021-10-19 Outpatient         HAILEY,   [facility]     [id number]

[address]



        00:00:00 00:00:00                 KENDRA                  417     Method

i



                                                                        st

 

        2021-10-19 2021-10-19 Outpatient         HAILEY,   [facility]     [id number]

[address]



        00:00:00 00:00:00                 KENDRA                  752     Method

i



                                                                        st

 

        2021-10-19 2021-10-19 Outpatient         HAILEY   [facility]     [id number]

[address]



        00:00:00 00:00:00                 KENDRA                  015     Method

i



                                                                        

 

        2021-10-04 2021-10-04 Outpatient         HAILEY   [facility]     [id number]

[address]



        00:00:00 00:00:00                 KENDRA                  142     Method

i



                                                                        

 

        2021 Outpatient         HAILEY   [facility]     [id number]

[address]



        00:00:00 00:00:00                 KENDRA                  689     Method

i



                                                                        

 

        2021 Office          [facility] [id number]

[address] [place]



        09:57:11 10:13:20 Visit           [place]  [facility]  [id number]         Tyrone BATEMAN 1 [id number]         



                                                        [phone]         



                                                        2               







Results







           Test Description Test Time  Test Comments Results    Result Comments 

Source









                          SARS-CoV-2 (COVID-19) RNA [Presence] in Respiratory sp

ecimen by 2021-10-26 

02:02:12                                



                    VIDHYA with probe detection                     









                      Test Item  Value      Reference Range Interpretation Comme

nts









             SARS-CoV-2 (COVID-19) RNA [Presence] in Respiratory Not detected No

t-Detected              



             specimen by VIDHYA with probe detection (test code =                  

                      



             16985-3)                                            

 

             Whether patient is employed in a healthcare setting                

                        



             (test code = 89117-8)                                        

 

             Whether the patient has symptoms related to condition              

                          



             of interest (test code = 79935-3)                                  

      

 

             Patient was hospitalized because of this condition                 

                       



             (test code = 23747-0)                                        

 

             Whether the patient was admitted to intensive care unit            

                            



             (ICU) for condition of interest (test code = 71900-9)              

                          

 

             Whether patient resides in a congregate care setting               

                         



             (test code = 66527-0) 04-Apr-2021

## 2022-05-30 LAB
BUN BLD-MCNC: 37 MG/DL (ref 7–18)
FERRITIN SERPL-MCNC: 179.2 NG/ML (ref 26–388)
GLUCOSE SERPLBLD-MCNC: 122 MG/DL (ref 74–106)
HCT VFR BLD CALC: 22 % (ref 39.6–49)
IRON SERPL-MCNC: 35 UG/DL (ref 65–175)
LYMPHOCYTES # SPEC AUTO: 1.1 K/UL (ref 0.7–4.9)
PMV BLD: 8.6 FL (ref 7.6–11.3)
POTASSIUM SERPL-SCNC: 3.8 MMOL/L (ref 3.5–5.1)
RBC # BLD: 2.43 M/UL (ref 4.33–5.43)
TRANSFERRIN SERPL-MCNC: 151 MG/DL (ref 200–360)

## 2022-05-30 PROCEDURE — 30233N1 TRANSFUSION OF NONAUTOLOGOUS RED BLOOD CELLS INTO PERIPHERAL VEIN, PERCUTANEOUS APPROACH: ICD-10-PCS

## 2022-05-30 RX ADMIN — HUMAN INSULIN SCH: 100 INJECTION, SOLUTION SUBCUTANEOUS at 11:30

## 2022-05-30 RX ADMIN — Medication SCH ML: at 20:42

## 2022-05-30 RX ADMIN — HUMAN INSULIN SCH UNIT: 100 INJECTION, SOLUTION SUBCUTANEOUS at 17:35

## 2022-05-30 RX ADMIN — GABAPENTIN SCH: 300 CAPSULE ORAL at 20:43

## 2022-05-30 RX ADMIN — Medication SCH ML: at 09:00

## 2022-05-30 RX ADMIN — HUMAN INSULIN SCH UNIT: 100 INJECTION, SOLUTION SUBCUTANEOUS at 20:41

## 2022-05-30 NOTE — CON
Date of Consultation:  05/30/2022



Reason For Consultation:  Chest pain and shortness of breath.



History Of Present Illness:  66-year-old male with history of coronary artery disease, status post re
cent stent placement __________ support, claimed that he felt better for some time and then started h
aving chest pain again along with shortness of breath on minimal exertion.  Generally, he feels very 
weak.  Has no nausea, vomiting, or diaphoresis.



Past Medical History:  Coronary artery disease, diabetes, dyslipidemia, hypertension, large prostate.




Medications:  Refer to reconciliation sheet for detailed list.



Allergies:  PENICILLIN.



Family History:  No premature coronary artery disease or cancer.



Social History:  Does not smoke or drink.  Does not use any drugs.



Review of Systems:

All systems reviewed, were negative except mentioned in HPI.



Physical Examination:

Vital Signs:  Reviewed. 

Head and Neck:  Pupils are equal, reactive to light.  Intact eye movements.  No cervical lymphadenopa
thy. 

Neck:  Supple.  Thyroid is not enlarged. 

Lungs:  Faint crackles on bases.  No accessory muscle use or muscle retraction.  

Heart:  Irregularly irregular with aortic systolic ejection murmur. 

Abdomen:  Soft, nontender.  Bowel sounds positive.  No organomegaly.  No masses or hernia.  No rigidi
ty or rebound. 

Extremities:  No clubbing, cyanosis.  Intact pulses. 

Skin:  No rashes. 

Neurologic:  Alert, awake, oriented x3.  No acute focal deficits appreciated.



Investigations:  BUN 37, creatinine 1.57, and his troponin I is negative.  Hemoglobin 7.5.  EKG witho
ut acute specific abnormalities.



Assessment And Recommendation:  Chest pain.  Known history of coronary artery disease.  Has aortic sy
stolic ejection murmur on exam.  Recommend to obtain echocardiogram and to evaluate aortic valve and 
also a nuclear stress test.  Discussed with the patient, he wants to do the above recommended follow 
up with his primary cardiologist and as such I will sign off on the case and thank you for allowing m
e to be a part of your patient's care.





/YOGESH

DD:  05/30/2022 17:05:46Voice ID:  [id number]

DT:  05/30/2022 19:41:56Report ID:  [id number]

## 2022-05-31 VITALS — OXYGEN SATURATION: 98 %

## 2022-05-31 VITALS — TEMPERATURE: 97.3 F | DIASTOLIC BLOOD PRESSURE: 68 MMHG | SYSTOLIC BLOOD PRESSURE: 155 MMHG

## 2022-05-31 LAB — HCT VFR BLD CALC: 31.6 % (ref 39.6–49)

## 2022-05-31 PROCEDURE — 30233N1 TRANSFUSION OF NONAUTOLOGOUS RED BLOOD CELLS INTO PERIPHERAL VEIN, PERCUTANEOUS APPROACH: ICD-10-PCS

## 2022-05-31 RX ADMIN — Medication SCH ML: at 08:11

## 2022-05-31 RX ADMIN — HUMAN INSULIN SCH: 100 INJECTION, SOLUTION SUBCUTANEOUS at 07:30

## 2022-05-31 RX ADMIN — GABAPENTIN SCH: 300 CAPSULE ORAL at 08:11

## 2022-05-31 NOTE — HP
Date of Admission:  2022



Chief Complaint:  Feeling weak and tired, chest pain, and shortness of breath.



History Of Present Illness:  This is a 66-year-old very pleasant male patient, 
who has multiple comorbidities, came into our emergency room with 3-4 days 
history of feeling very weak, tired with any activity.  For example, taking 3-4 
steps after that he would have some shortness of breath and chest pain.  He 
describes his chest pain as pressure type of feeling associated with some back 
pain.  All the symptoms get relieved with rest and when he does try to walk 
again next time, he would have same complaints.  No blood in stool.  No change 
in bowel habits.  No nausea, vomiting.  No abdominal pain.  No acid reflux.  No 
trouble swallowing.  He came into emergency room yesterday evening with these 
complaints.  After he was evaluated, he was admitted to the hospital.  This 
morning when I saw him, his brother and wife were present at bedside.



Allergies:  TO PENICILLIN.



Medications:  Metoprolol succinate 50 mg p.o. daily; atorvastatin 40 mg daily at
bedtime; clopidogrel 75 mg p.o. daily; Xarelto 20 mg p.o. daily with evening 
meal; Trulicity 3 mg per 0.5 mL, the patient injects 0.5 mL subcutaneous 
injection 1 week; ferrous sulfate 325 mg 2 times a day; Levemir insulin 30 units
in the morning and 15 units at bedtime; lisinopril 20 mg daily; metformin 500 mg
takes 2 tablets 2 times a day; mirtazapine 15 mg p.o. daily at bedtime; 
pantoprazole 40 mg daily 30 minutes before breakfast.



Review of Systems:

Constitutional:  As mentioned above. 

Cardiovascular:  As mentioned above. 

Respiratory:  As mentioned above. 

Neurology:  The patient has chronic problem with tingling, numbness of the right
foot. 



All other systems reviewed and negative.



Past Medical History:  Significant for history of TIA in 2021, 
peripheral neuropathy due to diabetes, diabetic retinopathy, type 2 diabetes 
mellitus, hypertension, hyperlipidemia, coronary artery disease, history of 
myocardial infarction in , atrial fibrillation, carotid artery stenosis, 
chronic anemia and his baseline hemoglobin is anywhere between 10.8 to 11.4.  
Last hemoglobin on 2022 was 10.8.  Past medical history also 
significant for thrombocytopenia.



Past Surgical History:  Coronary artery stent placement in 2022 and 
2022.  Coronary artery bypass surgery in , ablation for atrial 
fibrillation in 2021, carotid artery stent in , left leg 
angioplasty and stent in the past, and the patient had left leg below-knee 
amputation 2021 and right knee surgery in the past.



Family History:  Father , had diabetes and kidney disease.  Mother , had
diabetes, heart disease, and stroke.  Brother is alive and well.  Sister has 
diabetes.



Social History:  Prior history of smoking, not at present time.  Use of alcohol 
negative.



Immunization History:  The patient had his COVID-19 vaccine; first dose 2021, second dose 2021, third dose 2021.



Physical Examination:

Vital Signs:  Height 5 feet 7 inches, weight 198 pounds.  Temperature 97.1, 
pulse 51, respiratory rate 16, blood pressure 123/67, oxygen saturation 98%. 

General:  Awake, alert, oriented, not in distress. 

HEENT:  Head atraumatic, normocephalic.  Conjunctivae nonerythematous.  Sclerae 
white.  Mouth, no thrush or edema noted.  Ears/Nose, no mass, lesion, discharge 
noted. 

Neck:  Supple. No JVD, lymph nodes, bruit, thyromegaly noted. 

Lungs:  Bilateral good equal air entry. Clear to auscultation. No rhonchi.  No 
rales. 

Heart:  The patient's rhythm is irregularly irregular.  No murmur.  No gallop. 

Abdomen:  Soft, bowel sounds normal. No guarding, rigidity, tenderness, mass, 
hepatosplenomegaly, distention, or bruit noted. 

Extremities:  Significant for left below-knee amputation and no evidence of any 
open wound or any area of any infection.  Well-healed stump. 

Skin:  No rash, ulcer, cellulitis. 

Lymphatics:  No lymph node enlargement in neck, supraclavicular, infraclavicular
region. 

Neuro:  No focal neurological deficit. 

Chest:  Unremarkable. 

External Genitalia:  Deferred. 

Rectal:  Deferred.



Laboratory Data:  Yesterday; white count was 6.7, hemoglobin 8.3, platelets 192.
 This morning; white count 6.8, hemoglobin 7.5, platelets 186.  Yesterday; 
sodium 138, potassium 3.9, chloride 105, bicarb 24, BUN 33, creatinine 1.62, 
glucose 215.  Troponin 22.7.  ProBNP 4687.  Second troponin 23.3, third troponin
20.8.  This morning; sodium 139, potassium 3.8, chloride 109, bicarb 24, BUN 37,
creatinine 1.56, glucose 122, vitamin B12 282, ferritin 179.  Serum iron 35, 
TIBC 211.  Folic acid level pending.  Chest x-ray shows cardiomegaly, otherwise 
no acute changes.



Impression:  

1.   Acute blood loss anemia.

2.   Coronary artery disease.

3.   Chronic atrial fibrillation.

4.   Volume depletion.

5.   Chronic anticoagulation therapy.

6.   Type 2 diabetes mellitus with diabetic retinopathy.

7.   Type 2 diabetes mellitus with diabetic neuropathy.

8.   Hypertension.

9.   Hyperlipidemia.

10.   Carotid artery stenosis.

11.   Peripheral vascular disease.



Plan:  We will go ahead and admit the patient to hospital for further evaluation
and management of this problem.  The patient is appropriate for inpatient and is
expected to spend 2 midnights in hospital.  The patient's stool guaiac was 
negative, done in the emergency room yesterday.  At this point, I am definitely 
concerned about acute blood loss anemia as his hemoglobin was 10.8 just about 5 
weeks ago and it is much lower now with this hospital admission as we have 
noted.  He does not have any obvious signs, symptoms of any GI blood loss, but 
that is definitely over concern.  His creatinine on outpatient basis was around 
1.1 to 1.2 range and BUN and creatinine slightly have gone up.  There is a good 
possibility that we might be dealing with upper GI bleeding.  We do not have any
gastroenterologist available for any consultation and the patient sees Dr. Garnica on an outpatient basis and I have encouraged him that he should follow up
with him within next couple of weeks or so for further evaluation and management
of this anemia problem as he will need to have an EGD as well as colonoscopy 
done and if that does not explain, then he may need to have a capsule endoscopy 
done for complete workup looking for any underlying cause of blood loss.  I have
also encouraged him to follow up with his cardiologist, Dr. Kamara and I will try 
to reach out to him to communicate with him regarding the patient's current 
hospital presentation and we will discuss details with him at that time.  We 
will not give any anti-platelet therapy or any anticoagulant medication at this 
time.  I have ordered 2 units of packed red cell blood transfusion to be given 
and we will follow up on post transfusion hemoglobin and continue high-dose 
statin therapy and proton pump inhibitor per order.  Continue gabapentin for 
diabetic neuropathy and diabetes will be managed with sliding scale insulin.  
The patient remains on telemetry and he has atrial fibrillation.  His heart rate
has dropped down into 40 to 50 range and he takes metoprolol 50 mg daily at 
home.  I will hold on to it and I will consider to restart it and if necessary 
may be at a lower dose at appropriate time.  Other home medications will be 
restarted at appropriate time.  Details and plan of treatment were discussed 
with the patient in presence of his brother and the patient's wife.  I will see 
him tomorrow for followup.  This patient may benefit from Watchman procedure and
obviously the final decisions will be made by his cardiologist.  I have ordered 
stool guaiac to be done on a daily basis.





JEANNINE/YOGESH

DD:  2022 11:36:51   Voice ID:  [id number]

MTDD

## 2022-05-31 NOTE — EKG
Test Date:    2022-05-29               Test Time:    17:02:21

Technician:   PRAVEEN                                     

                                                     

MEASUREMENT RESULTS:                                       

Intervals:                                           

Rate:         59                                     

UT:                                                  

QRSD:         84                                     

QT:           446                                    

QTc:          441                                    

Axis:                                                

P:                                                   

UT:                                                  

QRS:          48                                     

T:            249                                    

                                                     

INTERPRETIVE STATEMENTS:                                       

                                                     

Atrial flutter with variable AV block

ST & T wave abnormality, consider anterior ischemia

Abnormal ECG

Compared to ECG 09/25/2017 16:55:32

Possible ischemia now present

Sinus rhythm no longer present

ST (T wave) deviation still present



Electronically Signed On 05-31-22 12:16:58 CDT by Royce Mario

## 2022-05-31 NOTE — PN
Date of Progress Note:  05/31/2022



The patient had been admitted by  05/29/2022.  He was seen by Dr. Vargas on 05/30/2022. 



The patient is not having anymore chest pain.  He had came in with anemia, angina, shortness of breat
h, and weakness.  He has had 2 units of blood transfusion.  He is asymptomatic right now.  He has a c
reatinine of 1.56.  He has chronic atrial fibrillation, rate of 50, which is chronic.  He takes Xarel
to for that.  He takes Effient with history of coronary artery disease, status post CABG in the past.
  He also has diabetes, hypertension, and dyslipidemia that are fairly well controlled.  His last ech
ocardiogram in 2017 showed mild-to-moderate decrease in ejection fraction of 35% to 49%.  I agree wit
h his present regimen.  He has already had a negative GI workup.  No further cardiac workup at this p
oint.  He will follow up with Dr. Kamara in the next couple of weeks.  He can go home as far as I am co
ncerned.  Case was discussed with Dr. Chacon.





NB/YOGESH

DD:  05/31/2022 12:45:49Voice ID:  [id number]

DT:  05/31/2022 13:15:19Report ID:  [id number]

## 2022-06-28 NOTE — DS
Date of Discharge:  05/31/2022



Disposition:  Discharged to go home.



Physical Examination:

HEENT:  Unremarkable. 

Lungs:  Clear to auscultation. 

Heart:  Sounds __________.  

No guarding, rigidity, tenderness, distention. 

Extremities:  No leg edema.



Discharge Medications And Instructions:  Continue all prior home medication except following changes.


1.Stop prasugrel.

2.Stop Xarelto.

3.Lower metoprolol succinate dose and take 50 mg.  The patient to take half a tablet by mouth daily.


4.Start aspirin 81 mg, take 1 tablet by mouth daily with food.

5.Follow up with Dr. Kamara, cardiologist as soon as possible this week.  Dr. Kamara's office will conta
ct you with appointment as I have discussed details with him yesterday.

6.Follow up at my office next week on Monday, which is 06/06/2022.  Call office for appointment.

7.Follow up with Dr. Garnica in 2 weeks.



Laboratory Data:  On 05/29/2022; white count 6.7, hemoglobin 8.3, platelets 192.  On 05/30/2022, hemo
globin was 7.5.  After blood transfusion, his last hemoglobin came up to 10.6.  Chemistry; his initia
l sodium 138, potassium 3.9, chloride 105, bicarb 24, BUN 33, creatinine 1.62, glucose 215.  Troponin
 was 22.7 on the first set, 23.3 for the second set, and 20.8 for the third set.  Vitamin B12 level 2
82, folic acid level 871.  Ferritin was 179.  Serum iron was 35.  TIBC was 211.



Hospital Course:  This is a 66-year-old pleasant male patient, who was admitted to the hospital with 
chief complaints of feeling weak, tired, chest pain and shortness of breath.  Please see dictated H a
nd P for more information.  After the patient was evaluated in the ER, he was admitted to the [facility] with this acute blood loss anemia problem.  The patient received blood transfusion.  He does see ca
rdiologist, Dr. Kamara on regular basis and I did communicate with Dr. Kamara regarding this hospital adm
ission with acute blood loss anemia and he recommended for the patient to stop his anti-platelet ther
apy which is prasugrel and anticoagulant therapy which is Xarelto.  The patient will need GI workup a
s well as cardiac workup, so he was made aware about my discussion with his cardiologist that he will
 need to follow up with his cardiologist as soon as possible per recommendation and also follow up wi
th his gastroenterologist.  After blood transfusion, the patient was discharged to go home in stable 
condition with above-mentioned medications and instructions.



Final Diagnoses:  

1.Acute blood loss anemia.

2.Coronary artery disease.

3.Chronic atrial fibrillation.

4.Volume depletion.

5.Chronic anticoagulation therapy.

6.Type 2 diabetes mellitus with neuropathy.

7.Type 2 diabetes mellitus with retinopathy.

8.Hypertension.

9.Hyperlipidemia.

10.Carotid artery stenosis.

11.Peripheral vascular disease.





JEANNINE/MODL

DD:  06/28/2022 06:14:31Voice ID:  [id number]

DT:  06/28/2022 09:50:32Report ID:  [id number]

## 2022-08-08 ENCOUNTER — HOSPITAL ENCOUNTER (EMERGENCY)
Dept: HOSPITAL 97 - ER | Age: 67
Discharge: HOME | End: 2022-08-08
Payer: COMMERCIAL

## 2022-08-08 VITALS — TEMPERATURE: 97.6 F

## 2022-08-08 VITALS — DIASTOLIC BLOOD PRESSURE: 74 MMHG | SYSTOLIC BLOOD PRESSURE: 126 MMHG | OXYGEN SATURATION: 94 %

## 2022-08-08 DIAGNOSIS — E11.9: ICD-10-CM

## 2022-08-08 DIAGNOSIS — R53.1: Primary | ICD-10-CM

## 2022-08-08 DIAGNOSIS — Z88.0: ICD-10-CM

## 2022-08-08 DIAGNOSIS — Z95.1: ICD-10-CM

## 2022-08-08 DIAGNOSIS — D64.9: ICD-10-CM

## 2022-08-08 DIAGNOSIS — I10: ICD-10-CM

## 2022-08-08 LAB
ALBUMIN SERPL BCP-MCNC: 3.7 G/DL (ref 3.4–5)
ALP SERPL-CCNC: 77 U/L (ref 45–117)
ALT SERPL W P-5'-P-CCNC: 18 U/L (ref 12–78)
AST SERPL W P-5'-P-CCNC: 14 U/L (ref 15–37)
BUN BLD-MCNC: 23 MG/DL (ref 7–18)
GLUCOSE SERPLBLD-MCNC: 160 MG/DL (ref 74–106)
HCT VFR BLD CALC: 29.1 % (ref 39.6–49)
INR BLD: 1.32
LYMPHOCYTES # SPEC AUTO: 0.6 K/UL (ref 0.7–4.9)
MAGNESIUM SERPL-MCNC: 1.8 MG/DL (ref 1.8–2.4)
MCV RBC: 94.4 FL (ref 80–100)
PMV BLD: 8.9 FL (ref 7.6–11.3)
POTASSIUM SERPL-SCNC: 4 MMOL/L (ref 3.5–5.1)
RBC # BLD: 3.08 M/UL (ref 4.33–5.43)
TROPONIN I SERPL HS-MCNC: 21 PG/ML (ref ?–58.9)

## 2022-08-08 PROCEDURE — 84484 ASSAY OF TROPONIN QUANT: CPT

## 2022-08-08 PROCEDURE — 93005 ELECTROCARDIOGRAM TRACING: CPT

## 2022-08-08 PROCEDURE — 96374 THER/PROPH/DIAG INJ IV PUSH: CPT

## 2022-08-08 PROCEDURE — 99284 EMERGENCY DEPT VISIT MOD MDM: CPT

## 2022-08-08 PROCEDURE — 83735 ASSAY OF MAGNESIUM: CPT

## 2022-08-08 PROCEDURE — 85610 PROTHROMBIN TIME: CPT

## 2022-08-08 PROCEDURE — 85025 COMPLETE CBC W/AUTO DIFF WBC: CPT

## 2022-08-08 PROCEDURE — 83880 ASSAY OF NATRIURETIC PEPTIDE: CPT

## 2022-08-08 PROCEDURE — 70450 CT HEAD/BRAIN W/O DYE: CPT

## 2022-08-08 PROCEDURE — 74176 CT ABD & PELVIS W/O CONTRAST: CPT

## 2022-08-08 PROCEDURE — 71045 X-RAY EXAM CHEST 1 VIEW: CPT

## 2022-08-08 PROCEDURE — 80076 HEPATIC FUNCTION PANEL: CPT

## 2022-08-08 PROCEDURE — 81003 URINALYSIS AUTO W/O SCOPE: CPT

## 2022-08-08 PROCEDURE — 80048 BASIC METABOLIC PNL TOTAL CA: CPT

## 2022-08-08 PROCEDURE — 36415 COLL VENOUS BLD VENIPUNCTURE: CPT

## 2022-08-08 PROCEDURE — 81015 MICROSCOPIC EXAM OF URINE: CPT

## 2022-08-08 NOTE — EDPHYS
Physician Documentation                                                                           

 [facility]                                                                 

Name: Milton Verdugo                                                                            

Age: 66 yrs                                                                                       

Sex: Male                                                                                         

: 1955                                                                                   

MRN: [id number]                                                                                   

Arrival Date: 2022                                                                          

Time: 14:02                                                                                       

Account#: [id number]                                                                            

Bed Treatment                                                                                     

Private MD: COLEEN Chacon C                                                                            

ED Physician Felipe Smith                                                                         

HPI:                                                                                              

                                                                                             

14:40 This 66 yrs old  Male presents to ER via Ambulatory with complaints of Weakness.cp  

14:40 The patient presents to the emergency department with weakness of the entire body,      cp  

      generalized weakness. Onset: The symptoms/episode began/occurred gradually.                 

                                                                                                  

Historical:                                                                                       

- Allergies:                                                                                      

14:08 PENICILLINS;                                                                            kr3 

- PMHx:                                                                                           

14:08 Diabetes - NIDDM; High Cholesterol; Hypertension;                                       kr3 

- PSHx:                                                                                           

14:08 Coronary artery bypass graft;                                                           kr3 

                                                                                                  

- Immunization history:: Client reports receiving the 2nd dose of the Covid vaccine.              

- Social history:: Smoking status: Patient/guardian denies using tobacco, the patient             

  reports quitting approximately 35 years ago.                                                    

                                                                                                  

                                                                                                  

ROS:                                                                                              

14:45 Constitutional: Positive for fatigue, Negative for body aches, chills, fever, poor PO   cp  

      intake.                                                                                     

14:45 Eyes: Negative for injury, pain, redness, and discharge.                                cp  

14:45 ENT: Negative for drainage from ear(s), ear pain, sore throat, difficulty swallowing,       

      difficulty handling secretions.                                                             

14:45 Cardiovascular: Positive for edema, Negative for chest pain, palpitations.                  

14:45 Respiratory: Negative for cough, shortness of breath, wheezing.                             

14:45 Abdomen/GI: Negative for abdominal pain, nausea, vomiting, and diarrhea.                    

14:45 : Negative for urinary symptoms.                                                          

14:45 Neuro: Positive for weakness, Negative for altered mental status, dizziness, headache,      

      loss of consciousness, syncope.                                                             

14:45 All other systems are negative.                                                             

                                                                                                  

Exam:                                                                                             

17:13 ECG was reviewed by the Attending Physician.                                            cp  

                                                                                                  

Vital Signs:                                                                                      

14:04  / 64; Pulse 77; Resp 20; Temp 97.6; Pulse Ox 96% on R/A; Weight 90.72 kg; Height kr3 

      5 ft. 7 in. (170.18 cm); Pain 0/10;                                                         

14:38  / 62; Pulse 90; Resp 16; Pulse Ox 100% ; Pain 0/10;                              kb3 

16:58  / 50; Pulse 58; Resp 21; Pulse Ox 97% ; Pain 0/10;                               kb3 

17:32 Pulse 77; Pulse Ox 98% on R/A;                                                          dh3 

18:32  / 74; Pulse 51; Resp 16; Pulse Ox 94% ; Pain 0/10;                               kb3 

14:04 Body Mass Index 31.32 (90.72 kg, 170.18 cm)                                             kr3 

17:32 while ambulating                                                                        dh3 

                                                                                                  

NIH Stroke Scale Scores:                                                                          

14:38 NIHSS Score: 0                                                                          kb3 

                                                                                                  

Leno Coma Score:                                                                               

14:39 Eye Response: spontaneous(4). Verbal Response: oriented(5). Motor Response: obeys       kb3 

      commands(6). Total: 15.                                                                     

                                                                                                  

MDM:                                                                                              

14:25 Patient medically screened.                                                             cp  

18:46 Physician consultation: A Oswaldo BOYLE was contacted at 18:40, regarding consult, patient's  cp  

      condition, will have patient double oral dose of Lasix for next 3 days and f/u in           

      clinic with DR Chacon this week.                                                              

                                                                                                  

                                                                                             

14:36 Order name: Basic Metabolic Panel; Complete Time: 15:38                                 cp  

                                                                                             

15:38 Interpretation: Normal except: ; GLUC 160; BUN 23; CRE 1.56; GFR 49.              cp  

                                                                                             

14:36 Order name: CBC with Diff; Complete Time: 15:38                                         cp  

08                                                                                             

15:39 Interpretation: Normal except: RBC 3.08; HGB 9.6; HCT 29.1; MCV 94.4; ; RDW      cp  

      16.8; JAMIR% 75.8; LYMA 0.6; LYM% 14.0.                                                       

                                                                                             

14:36 Order name: LFT's; Complete Time: 15:38                                                 cp  

                                                                                             

16:24 Interpretation: Normal except: AST 14; BILID 0.3; GLOB 3.8; A/G 1.0.                    cp  

                                                                                             

14:36 Order name: Magnesium; Complete Time: 15:38                                             cp  

                                                                                             

14:36 Order name: NT PRO-BNP; Complete Time: 15:38                                            cp  

                                                                                             

16:54 Interpretation: Abnormal: NT PRO-BNP 3994.                                              cp  

                                                                                             

14:36 Order name: PT-INR; Complete Time: 15:38                                                cp  

                                                                                             

14:36 Order name: Troponin HS; Complete Time: 15:38                                           cp  

                                                                                             

14:36 Order name: XRAY Chest (1 view); Complete Time: 15:38                                     

                                                                                             

14:37 Order name: CT Head Brain wo Cont; Complete Time: 16:24                                   

                                                                                             

16:25 Interpretation: Report reviewed.                                                          

                                                                                             

15:46 Order name: CT Abd/Pelvis - Without Contrast; Complete Time: 16:54                        

                                                                                             

15:47 Order name: Urine Microscopic Only                                                        

                                                                                             

18:56 Order name: Urine Dipstick-Ancillary                                                    EDMS

                                                                                             

14:36 Order name: EKG; Complete Time: 14:37                                                     

                                                                                             

14:36 Order name: Cardiac monitoring; Complete Time: 15:20                                      

                                                                                             

14:36 Order name: EKG - Nurse/Tech; Complete Time: 15:21                                        

                                                                                             

14:36 Order name: IV Saline Lock; Complete Time: 15:00                                          

                                                                                             

14:36 Order name: Labs collected and sent; Complete Time: 15:00                                 

                                                                                             

14:36 Order name: O2 Per Protocol; Complete Time: 15:00                                         

                                                                                             

14:36 Order name: O2 Sat Monitoring; Complete Time: 15:00                                       

                                                                                             

17:13 Order name: Misc. Order: ambulate with pulse oximeter; Complete Time: 18:19             cp  

                                                                                                  

EC:13 Rate is 53 beats/min. Rhythm is irregular. QRS interval is normal. QT interval is       cp  

      prolonged at 508 msec. Interpreted by me. Reviewed by me.                                   

                                                                                                  

Administered Medications:                                                                         

15:42 CANCELLED (Physician Discretion): Lasix (furosemide) 40 mg IVP once; give over 2 minutescp  

16:11 Drug: Lasix (furosemide) 20 mg Route: IVP; Site: left wrist;                            hb  

                                                                                                  

                                                                                                  

Disposition Summary:                                                                              

22 18:48                                                                                    

Discharge Ordered                                                                                 

      Location: Home                                                                          cp  

      Problem: new                                                                            cp  

      Symptoms: have improved                                                                 cp  

      Condition: Stable                                                                       cp  

      Diagnosis                                                                                   

        - Weakness - general                                                                  cp  

        - Anemia in other chronic diseases classified elsewhere                               cp  

      Followup:                                                                               cp  

        - With: COLEEN Chacon MD                                                                        

        - When: 2 - 3 days                                                                         

        - Reason: Recheck today's complaints                                                       

      Discharge Instructions:                                                                     

        - Discharge Summary Sheet                                                             cp  

        - Anemia                                                                              cp  

        - Weakness                                                                            cp  

      Forms:                                                                                      

        - Medication Reconciliation Form                                                      cp  

        - Thank You Letter                                                                    cp  

        - Antibiotic Education                                                                cp  

        - Prescription Opioid Use                                                             cp  

                                                                                                  

NIH Stroke Scale - NIH Stroke Score                                                               

Date: 2022                                                                                  

Time: 14:38                                                                                       

Total Score = 0                                                                                   

  1a. Level of Consciousness (LOC) - 0(Alert)                                                     

  1b. Level of Consciousness (LOC) (Month \T\ Age) - 0(Both)                                      

  1c. LOC Commands (Open \T\ Closes Eyes/) - 0(Both)                                          

   2. Best Gaze (Lateral Gaze Paresis) - 0(Normal)                                                

   3. Visual Field Loss - 0(No visual loss)                                                       

   4. Facial Palsy - 0(Normal)                                                                    

  5a. Left Arm: Motor (10-second hold) - 0(No drift)                                              

  5b. Right Arm: Motor (10-second hold) - 0(No drift)                                             

  6a. Left Leg: Motor (5-second hold - always test supine) - 0(No drift)                          

  6b. Right Leg: Motor (5-second hold - always test supine) - 0(No drift)                         

   7. Limb Ataxia (finger/nose \T\ heel/shin - test with eyes open) - 0(Absent)                   

   8. Sensory Loss (pinprick arms/legs/face) - 0(Normal)                                          

   9. Best Language: Aphasia (description/naming/reading) - 0(No aphasia)                         

  10. Dysarthria (speech clarity - read or repeat words) - 0(Normal)                              

  11. Extinction and Inattention (visual/tactile/auditory/spatial/personal) - 0(No                

      abnormality)                                                                                

Initials: kb3                                                                                     

                                                                                                  

Addendum:                                                                                         

2022                                                                                        

     21:45 Co-signature as Attending Physician, Felipe Smith MD.                            rn     

                                                                                                  

Signatures:                                                                                       

Dispatcher [employer]                           EDFelipe Hanna MD MD rn Page, Corey, PA PA   cp                                                   

Kaylie Fay RN RN hb Reid, Kelley, RN                        RN   kr3                                                  

                                                                                                  

Corrections: (The following items were deleted from the chart)                                    

                                                                                             

14:11 14:08 PMHx: Prostate; kr3                                                       kr3         

15:42 15:41 Lasix (furosemide) 40 mg IVP once; give over 2 minutes ordered. cp        cp          

                                                                                                  

**************************************************************************************************

## 2022-08-08 NOTE — XMS REPORT
Continuity of Care Document

                            Created on:2022



Patient:VANDA VERDUGO

Sex:Male

:1955

External Reference #:[id number]





Demographics







                          Address                   [address]



                                                    [place] [address]

 

                          Home Phone                [phone]

 

                          Work Phone                [phone]

 

                          Mobile Phone              [phone]

 

                          Email Address             [email or web address]

 

                          Preferred Language        English

 

                          Marital Status            Unknown

 

                          Jew Affiliation     Unknown

 

                          Race                      Unknown

 

                          Additional Race(s)         or 



                                                    Unavailable



                                                    White

 

                          Ethnic Group              Unknown









Author







                          Organization              [facility]

t

 

                          Address                   [address]



                                                    [place] [address]

 

                          Phone                     [phone]









Support







                Name            Relationship    Address         Phone

 

                GHAZAL VERDUGO Spouse          Unavailable     [phone]

 

                Corazon Verdugo   Spouse          [address]    [phone]



                                                [place] [address] 

 

                Luz Guevara Child           Unavailable     [phone]









Care Team Providers







                    Name                Role                Phone

 

                    Ashleigh BOYLE, Hari A Primary Care Physician [phone]

[phone]

 

                    Linda Monae Kelcey Attending Clinician Unavailable

 

                    [id number]              Attending Clinician Unavailable

 

                    LOGAN BA      Attending Clinician Unavailable

 

                    KENDRA HART        Attending Clinician Unavailable

 

                    LEMUEL RUVALCABA Attending Clinician Unavailable

 

                    Lemuel Hameed Attending Clinician [phone]

 

                    MD KENDRA HART  Attending Clinician Unavailable

 

                    Doctor Unassigned, No Name Attending Clinician Unavailable

 

                    Linda Monae Kelcey Admitting Clinician Unavailable

 

                    [id number]              Admitting Clinician Unavailable

 

                    KENDRA HART        Admitting Clinician Unavailable

 

                    MD KENDRA HART  Admitting Clinician Unavailable









Payers







           Payer Name Policy Type Policy Number Effective Date Expiration Date YVETTE dang

 

           AET        AET        [id number]                       

 

           [employer]ET[employer] CHOICE POS II            [id number]  2001            



                                            00:00:00              

 

           [employer]CIAL            [id number] 2019            



           OUT OF NETWORK                       00:00:00              







Problems







       Condition Condition Condition Status Onset  Resolution Last   Treating Co

mments 

Source



       Name   Details Category        Date   Date   Treatment Clinician        



                                                 Date                 

 

       Amputation Amputation Disease Active                              U

T



       below knee below knee                                              He

alth



                                   00:00:                             



                                   00                                 

 

       PAOD   PAOD   Disease Active                              [facility]



       (periphera (periphera               9-07                               He

alth



       l arterial l arterial               00:00:                             



       occlusive occlusive               00                                 



       disease) disease)                                                  

 

       PAD    PAD    Disease Active                              [facility]



       (periphera (periphera               7-30                               He

alth



       l artery l artery               00:00:                             



       disease) disease)               00                                 

 

       S/P BKA S/P BKA Disease Active                              [facility]



       (below (below               7-20                               ity of



       knee   knee                 00:00:                             [place]



       amputation amputation               00                                 Me

dical



       ), left ), left                                                  Branch

 

       Infection Infection Disease Active                              Uni

vers



                                   6-01                               ity of



                                   00:00:                             [place]



                                   00                                 Medical



                                                                      Branch

 

       Other  Other  Disease Active                              [facility]



       diseases diseases               2-09                               ity of



       of nasal of nasal               00:00:                             [place]



       cavity and cavity and               00                                 Me

dical



       sinuses sinuses                                                  Branch

 

       Mitral Mitral Disease Active                              [facility]



       late   late                 2-09                               ity of



       systolic systolic               00:00:                             [place]



       murmur murmur               00                                 Medical



                                                                      Branch

 

       Primary Primary Disease Active                              [facility]



       hypertensi hypertensi               2-09                               it

y of



       on     on                   00:00:                             [place]



                                   00                                 Medical



                                                                      Branch

 

       Occlusion Occlusion Disease Active                              Uni

vers



       and    and                  2-09                               ity of



       stenosis stenosis               00:00:                             [place]



       of carotid of carotid               00                                 Me

dical



       artery artery                                                  Branch

 

       Hearing Hearing Disease Active                              Univers



       loss   loss                 2-09                               ity of



                                   00:00:                             [place]



                                   00                                 Medical



                                                                      Branch

 

       Diabetic Diabetic Disease Active                              Unive

rs



       peripheral peripheral               2-09                               it

y of



       neuropathy neuropathy               00:00:                             [facility]

[place]



                                   00                                 Medical



                                                                      Branch

 

       Osteomyeli Osteomyeli Disease Active                              U

nivers



       tis    tis                  2-01                               ity of



                                   00:00:                             [place]



                                   00                                 Medical



                                                                      Branch

 

       Wound  Wound  Disease Active                       Overview: Univer

s



       dehiscence dehiscence               2-01                        Formattin

 ity of



                                   00:00:                      g of this [place]



                                   00                          note   Medical



                                                               might be Branch



                                                               different 



                                                               from the 



                                                               original. 



                                                               Added  



                                                               automatic 



                                                               ally from 



                                                               request 



                                                               for    



                                                               surgery 



                                                               [id number] 

 

       S/P PICC S/P PICC Disease Active                              Unive

rs



       central central               1-15                               ity of



       line   line                 00:00:                             [place]



       placement placement               00                                 Medi

shmuel



                                                                      Branch

 

       Osteomyeli Osteomyeli Disease Active                       Overview

: Univers



       tis of tis of               1-11                        Added  ity of



       metatarsal metatarsal               00:00:                      automatic

 [place]



                                   00                          ally from Medical



                                                               request Branch



                                                               for    



                                                               surgery 



                                                               [id number] 

 

       Left foot Left foot Disease Active 2021-0                             Uni

vers



       infection infection               1-11                               ity 

of



                                   00:00:                             [place]



                                   00                                 Medical



                                                                      Branch

 

       Post-opera Post-opera Disease Active 2020                             U

nivers



       tive   tive                 2-19                               ity of



       complicati complicati               00:00:                             [facility]

[place]



       on     on                                                    Medical



                                                                      Branch

 

       S/P    S/P    Disease Active 2020                             [facility]



       ablation ablation               2-14                               ity of



       of atrial of atrial               00:00:                             [place]

[place]



       fibrillati fibrillati               00                                 Me

dical



       on     on                                                      Branch

 

       Peripheral Peripheral Disease Active 2020                      Overview

: Univers



       vascular vascular               2-07                        Formattin ity

 of



       disease, disease,               00:00:                      g of this [place]

as



       unspecifie unspecifie               00                          note   Me

dical



       d      d                                                might be Branch



                                                               different 



                                                               from the 



                                                               original. 



                                                               Added  



                                                               automatic 



                                                               ally from 



                                                               request 



                                                               for    



                                                               surgery 



                                                               [id number] 

 

       Ischemic Ischemic Disease Active 2020                             Unive

rs



       ulcer of ulcer of               0-30                               ity of



       toe of toe of               00:00:                             [place]



       left foot left foot               00                                 Medi

shmuel



                                                                      Branch

 

       Ischemic Ischemic Disease Active 2020                      Overview: Un

chris



       ulcer of ulcer of               0-29                        Added  ity of



       toe of toe of               00:00:                      automatic [place]



       left foot left foot               00                          ally from BESSIE haas



       with   with                                             request Branch



       necrosis necrosis                                           for    



       of muscle of muscle                                           surgery 



                                                               [id number] 

 

       Toe    Toe    Disease Active 2020                      Overview: Univer

s



       osteomyeli osteomyeli               0-29                        Added  it

y of



       tis, left tis, left               00:00:                      automatic [place]

[place]



                                   00                          ally from Medical



                                                               request Branch



                                                               for    



                                                               surgery 



                                                               [id number] 

 

       Thrombus Thrombus Disease Active                              Unive

rs



                                   3-10                               ity of



                                   00:00:                             [place]



                                   00                                 Medical



                                                                      Branch

 

       Status Status Disease Active                              Univers



       post left post left               3-09                               ity 

of



       heart  heart                00:00:                             [place]



       catheteriz catheteriz               00                                 Me

dical



       ation  ation                                                   Branch



       ([facility]) by ([facility]) by                                                  



       abigail hayes                                                  



       us     us                                                      



       approach approach                                                  

 

       Acquired Acquired Disease Active 2019                             Unive

rs



       renal cyst renal cyst               1-12                               it

y of



       of right of right               00:00:                             [place]



       kidney kidney               00                                 Medical



                                                                      Branch

 

       Acquired Acquired Disease Active 2019                             Unive

rs



       renal cyst renal cyst               1-12                               it

y of



       of right of right               00:00:                             [place]



       kidney kidney               00                                 Medical



                                                                      Branch

 

       Renal  Renal  Disease Active 2019                             [facility]



       dysfunctio dysfunctio               0-31                               it

y of



       n      n                    00:00:                             [place]



                                   00                                 Medical



                                                                      Branch

 

       Intestinal Intestinal Disease Active                       Overview

: Univers



       metaplasia metaplasia               7-30                        Formattin

 ity of



       of gastric of gastric               00:00:                      g of this

 [place]



       mucosa mucosa               00                          note   Medical



                                                               might be Branch



                                                               different 



                                                               from the 



                                                               original. 



                                                               Added  



                                                               automatic 



                                                               ally from 



                                                               request 



                                                               for    



                                                               surgery 



                                                               [id number] 

 

       PAD    PAD    Disease Active 2018                             [facility]



       (periphera (periphera               0-30                               it

y of



       l artery l artery               00:00:                             [place]



       disease) disease)               00                                 Medica

l



                                                                      Branch

 

       Proteinuri Proteinuri Disease Active                              U

nivers



       a      a                    8-04                               ity of



                                   00:00:                             [place]



                                   00                                 Medical



                                                                      Branch

 

       History of History of Disease Active                       Overview

: Univers



       colon  colon                5-03                        Formattin ity of



       polyps polyps               00:00:                      g of this [place]



                                   00                          note   Medical



                                                               might be Branch



                                                               different 



                                                               from the 



                                                               original. 



                                                               Added  



                                                               automatic 



                                                               ally from 



                                                               request 



                                                               for    



                                                               surgery 



                                                               [id number] 

 

       Hepatomega Hepatomega Disease Active                       Overview

: Univers



       ly     ly                   4-30                        Formattin ity of



                                   00:00:                      g of this [place]



                                   00                          note   Medical



                                                               might be Branch



                                                               different 



                                                               from the 



                                                               original. 



                                                               Mild per 



                                                               US     

 

       S/P CABG x S/P CABG x Disease Active                              U

nivers



       4      4                    4-08                               ity of



                                   00:00:                             [place]



                                   00                                 Medical



                                                                      Branch

 

       Anemia Anemia Disease Active                              Univers



                                   4-03                               ity of



                                   00:00:                             [place]



                                   00                                 Medical



                                                                      Branch

 

       Chest pain Chest pain Disease Active                              U

nivers



                                   3-04                               ity of



                                   00:00:                             [place]



                                   00                                 Medical



                                                                      Branch

 

       Chronic Chronic Disease Active                              Univers



       diastolic diastolic               3-04                               ity 

of



       congestive congestive               00:00:                             Te

[place]



       heart  heart                00                                 Medical



       failure failure                                                  Branch

 

       PAF    PAF    Disease Active                              Univers



       (paroxysma (paroxysma               2-07                               it

y of



       l atrial l atrial               00:00:                             [place]



       fibrillati fibrillati               00                                 Me

dical



       on)    on)                                                     Branch

 

       DEN    DEN    Disease Active                              Univers



       (obstructi (obstructi               3-15                               it

y of



       ve sleep ve sleep               00:00:                             [place]



       apnea) apnea)               00                                 Medical



                                                                      Branch

 

       Coronary Coronary Disease Active 2015                             Unive

rs



       artery artery               2-08                               ity of



       disease disease               00:00:                             [place]



                                   00                                 Medical



                                                                      Branch

 

       Mixed  Mixed  Disease Active 2015                             Univers



       hyperlipid hyperlipid               2-08                               it

y of



       emia   emia                 00:00:                             [place]



                                   00                                 Medical



                                                                      Branch

 

       Diabetes Diabetes Disease Active 2015                             Un[facility]

rs



       mellitus mellitus               2-08                               ity of



                                   00:00:                             [place]



                                   00                                 Medical



                                                                      Branch

 

       Dyspnea Dyspnea Disease Active 2015                             [facility]



                                                                  [facility] of



                                   00:00:                             [place]



                                   00                                 Medical



                                                                      Branch

 

       Bilateral Bilateral Disease Active 2015                             Uni

vers



       carotid carotid                                              ity of



       artery artery               00:00:                             [place]



       disease disease               00                                 Medical



                                                                      Branch

 

       Obesity Obesity Disease Active 2015                             [facility]



                                   08                               ity of



                                   00:00:                             [place]



                                   00                                 Medical



                                                                      Branch

 

       B12    B12    Disease Active                                    [facility]



       deficiency deficiency                                                  it

y of



                                                                      [facility]







Allergies, Adverse Reactions, Alerts







       Allergy Allergy Status Severity Reaction(s) Onset  Inactive Treating Comm

ents 

Source



       Name   Type                        Date   Date   Clinician        

 

       Waldemar Propensi Active                                     [facility]



       ins    ty to                                               Health



              adverse                      00:00:                      



              reaction                      00                          



              s                                                       

 

       PENICILL DRUG   Active        Anaphylaxis 2015                      Uni

vers



       IN     INGREDI                                              ity of



                                          00:00:                      [place]



                                          00                          Medical



                                                                      Branch

 

       Penicill Propensi Active        Anaphylaxis 2015               Patient 

[facility]



       in     ty to                                        reports ity of



              adverse                      00:00:               reaction [place]



              reaction                      00                   was bad. Medica

l



              s                                                Was given [facility]



                                                               Cefepime 



                                                               without 



                                                               adverse 



                                                               reaction 







Social History







           Social Habit Start Date Stop Date  Quantity   Comments   Source

 

           Exposure to                       Not sure              [facility]



           SARS-CoV-2 (event)                                             [facility]

 

           History of tobacco                       Cigarette Smoker            

[facility]



           use                                                    [facility]

 

           Alcohol intake 2022 0 /d                  [facility]

 of



                      00:00:00   00:00:00                         [facility]

 

           Tobacco Comment 2020 quit 1996             [facility]

y of



                      00:00:00   00:00:00                         [facility]

 

           Cigarettes smoked 2015                       [facility]

ity of



           current (pack per 00:00:00   00:00:00                         [place] [facility]

edical



           day) - Reported                                             Branch

 

           Cigarette  2015                       [facility]



           pack-years 00:00:00   00:00:00                         [facility]

 

           Tobacco use and 2015 Never used            [facility]

y of



           exposure   00:00:00   00:00:00                         [facility]

 

           Sex Assigned At [birth year]                       [facility]

y of



           Birth      00:00:00   00:00:00                         [facility]









                Smoking Status  Start Date      Stop Date       Source

 

                Former smoker   2015 00:00:00 2015 00:00:00 [facility]

ty of [facility]







Medications







       Ordered Filled Start  Stop   Current Ordering Indication Dosage Frequency

 Signature

                    Comments            Components          Source



     Medication Medication Date Date Medication? Clinician                (SIG) 

          



     Name Name                                                   

 

     lisinopriL            Yes            20mg      Take 1           Unive

rs



     (PRINIVIL)      1-18                               tablet by           ity 

of



     20 mg      00:00:                               mouth           [place]



     tablet      00                                 daily.           Medical



                                                  Please           Branch



                                                  note           



                                                  tablet           



                                                  dose           



                                                  change.           

 

     spironolact            Yes       [id number] 12.5mg      Take 0.5       

    [facility]



     one 25 mg      1-04                               tablets by           ity 

of



     tablet      00:00:                               mouth           [place]



               00                                 daily.           Medical



                                                                 Branch

 

     gabapentin      2021      Yes            300mg      Take 300           Un

chris



     300 mg      1-08                               mg by           ity of



     capsule      13:35:                               mouth.           [place]



               36                                                Medical



                                                                 Branch

 

     rivaroxaban      2021      Yes                      1 (one)           Uni

vers



     20 mg      1-01                               time each           ity of



     tablet      00:00:                               day at the           [place]



               00                                 same time.           Medical



                                                                 Branch

 

     clopidogrel            Yes                      1 (one)           [facility]



     (Plavix) 75      9-07                               time each           Hea

lth



     MG tablet      17:25:                               day at the           



               23                                 same time.           

 

     rivaroxaban            Yes                      1 (one)           [facility]



     (Xarelto)      9-07                               time each           Healt

h



     20 MG      17:25:                               day at the           



     tablet      23                                 same time.           

 

     clopidogrel            Yes                      1 (one)           [facility]



     (Plavix) 75      9-07                               time each           Hea

lth



     MG tablet      17:25:                               day at the           



               23                                 same time.           

 

     rivaroxaban            Yes                      1 (one)           [facility]



     (Xarelto)      9-07                               time each           Healt

h



     20 MG      17:25:                               day at the           



     tablet      23                                 same time.           

 

     clopidogrel            Yes                      1 (one)           [facility]



     (Plavix) 75      9-07                               time each           Hea

lth



     MG tablet      17:25:                               day at the           



               23                                 same time.           

 

     rivaroxaban            Yes                      1 (one)           [facility]



     (Xarelto)      9-07                               time each           Healt

h



     20 MG      17:25:                               day at the           



     tablet      23                                 same time.           

 

     Insulin            Yes       [id number]           Use as           Univ

ers



     Jarrell,      7-                               directed           ity of



     Disposable,      00:00:                               BId E11.65           

[place]



     (PEN      00                                                Medical



     NEEDLE) 32                                                        Branch



     gauge x                                                        



     5/32" Ndle                                                        

 

     blood sugar            Yes       [id number]           Use to           

[facility]



     diagnostic      7-                               check           ity of



     (ACCU-CHEK      00:00:                               blood           [place]



     URIEL PLUS      00                                 sugar 3X           Medic

al



     TEST STRP)                                         daily.           Branch



     strip                                         DX:[id number]           

 

     lancets 33            Yes       [id number]           Use to           U

nivers



     gauge Misc      7-19                               check           ity of



               00:00:                               blood           [place]



               00                                 sugar 3X           Medical



                                                  daily.           Branch



                                                  DX:E11.42           

 

     metoprolol            Yes       [id number] 50mg      Take 1         

  [facility]



     succinate      7-06                100            tablet by           ity o

f



     XL 50 mg 24      00:00:                               mouth 2           [facility]

as



     hr tablet      00                                 (two)           Medical



                                                  times           Branch



                                                  daily.           

 

     aspirin 81            Yes            81mg      Take 1           Unive

rs



     mg chewable      6-23                               tablet by           ity

 of



     tablet      00:00:                               mouth           [place]



               00                                 daily.           Medical



                                                                 Branch

 

     aspirin            Yes                      1 (one)           [facility]



     (ASPIRIN)      6-23                               time each           Healt

h



     81 MG      00:00:                               day at the           



     chewable      00                                 same time.           



     tablet                                                        

 

     aspirin            Yes                      1 (one)           [facility]



     (ASPIRIN)      6-23                               time each           Healt

h



     81 MG      00:00:                               day at the           



     chewable      00                                 same time.           



     tablet                                                        

 

     aspirin            Yes                      1 (one)           [facility]



     (ASPIRIN)      6-23                               time each           Healt

h



     81 MG      00:00:                               day at the           



     chewable      00                                 same time.           



     tablet                                                        

 

     mirtazapine            Yes            15mg      Take 1           Univ

ers



     15 mg      6-22                               tablet by           ity of



     tablet      00:00:                               mouth at           [place]



               00                                 bedtime.           Medical



                                                                 Branch

 

     HYDROcodone            Yes       [id number] 1{tbl}      Take 1           Un

chris



     -acetaminop      6-22                               tablet by           ity

 of



     hen       00:00:                               mouth           [place]



     mg tablet      00                                 every 6           Medical



                                                  (six)           Branch



                                                  hours as           



                                                  needed for           



                                                  Pain           



                                                  (scale           



                                                  4-6).           



                                                  Indication           



                                                  s: acute           



                                                  pain           

 

     Insulin            Yes       [id number]           INJECT 30           

Univers



     Detemir      5-05                               UNITS           ity of



     (LEVEMIR      00:00:                               SUBCUTANEO           Daniel

as



     FLEXTOUCH      00                                 USLY EVERY           Medi

shmuel



     U-100                                         MORNING           Branch



     INSULN) 100                                         AND 15           



     unit/mL (3                                         UNITS           



     mL)                                          EVERY           



     injection                                         EVENING           

 

     CLOPIDOGREL            Yes       Status post           TAKE 1        

   [facility]



     75 mg      4-22                left heart           TABLET BY           ity

 of



     tablet      00:00:                catheteriza           MOUTH           Daniel

as



               00                  tion (LHC)           EVERY DAY           Medi

shmuel



                                   by                            Branch



                                   percutaneou                          



                                   s approach                          

 

     dulaglutide            Yes       Type 2 1.5mg      inject 1.5        

   [facility]



     (TRULICITY)      3-31                diabetes           mg under           

ity of



     1.5 mg/0.5      00:00:                mellitus           the skin          

 [place]



     mL PnIj      00                  with           weekly.           Medical



                                   peripheral                          Branch



                                   neuropathy                          

 

     metformin            Yes       Type 2 1000mg      Take 2           Un

chris



      mg      3-31                diabetes           tablets by           

ity of



     24 hr      00:00:                mellitus           mouth 2           [place]



     tablet      00                  with           (two)           Medical



                                   peripheral           times           Branch



                                   neuropathy           daily.           

 

     dulaglutide            Yes       [id number] 1.5mg      inject 1.5      

     [facility]



     (TRULICITY)      3-31                               mg under           ity 

of



     1.5 mg/0.5      00:00:                               the skin           Daniel

as



     mL PnIj      00                                 weekly.           Medical



                                                                 Branch

 

     metformin            Yes       [id number] 1000mg      Take 2           

[facility]



      mg      3-31                               tablets by           ity 

of



     24 hr      00:00:                               mouth 2           [place]



     tablet      00                                 (two)           Medical



                                                  times           Branch



                                                  daily.           

 

     atorvastati            Yes            80mg      Take 80 mg           

[facility]



     n (Lipitor)      3-15                               by mouth           Heal

th



     80 MG      00:00:                               every           



     tablet      00                                 night.           

 

     atorvastati            Yes            80mg      Take 80 mg           

[facility]



     n (Lipitor)      3-15                               by mouth           Heal

th



     80 MG      00:00:                               every           



     tablet      00                                 night.           

 

     atorvastati            Yes            80mg      Take 80 mg           

[facility]



     n (Lipitor)      3-15                               by mouth           Heal

th



     80 MG      00:00:                               every           



     tablet      00                                 night.           

 

     lisinopriL            Yes            20mg      Take 1           Unive

rs



     (PRINIVIL)      2-23                               tablet by           ity 

of



     20 mg      00:00:                               mouth           [place]



     tablet      00                                 daily.           Medical



                                                  Please           Branch



                                                  note           



                                                  tablet           



                                                  dose           



                                                  change.           

 

     pantoprazol      2021- No        S/P PICC 40mg      Take 1          

 Univers



     e 40 mg EC                 central           tablet by           

ity of



     tablet      00:00: 04:59           line           mouth           [place]



               00   :00            placement           daily for           Medic

al



                                                  180 days.           Branch

 

     gabapentin      2021- No        S/P PICC 300mg      Take 1          

 [facility]



     300 mg                 central           capsule by           ity

 of



     capsule      00:00: 04:59           line           mouth           [place]



               00   :00            placement           every           Medical



                                                  morning           Branch



                                                  and at           



                                                  1200           



                                                  (noon) for           



                                                  180 days.           

 

     acetaminoph      2022- No        S/P PICC 650mg      Take 2         

  [facility]



     en 325 mg                 central           tablets by           

ity of



     tablet      00:00: 05:59           line           mouth           [place]



               00   :00            placement           every 6           Medical



                                                  (six)           Branch



                                                  hours as           



                                                  needed for           



                                                  Pain           



                                                  (scale           



                                                  1-3).           

 

     furosemide       No        Chronic 40mg      Take 1           U

nivers



     40 mg                 heart           tablet by           ity of



     tablet      00:00: 04:59           failure           mouth           [place]



               00   :00            with           every           Medical



                                   preserved           morning           Branch



                                   ejection           and            



                                   fraction           evening           



                                                  for 180           



                                                  days. Take           



                                                  one tablet           



                                                  by mouth           



                                                  in the           



                                                  morning           



                                                  and 1/2           



                                                  (half)           



                                                  tablet by           



                                                  mouth in           



                                                  the            



                                                  evening.           

 

     rivaroxaban       No        atrial 20mg      Take 1           U

nivers



     (XARELTO)                 fibrillatio           tablet by        

   ity of



     20 mg      00:00: 04:59           n              mouth           [place]



     tablet      00   :00                           every           Medical



                                                  evening           Branch



                                                  for 180           



                                                  days.           



                                                  Indication           



                                                  s: atrial           



                                                  fibrillati           



                                                  on             

 

     gabapentin      2021- No        S/P PICC 600mg      Take 1          

 [facility]



     600 mg                 central           tablet by           ity 

of



     tablet      00:00: 04:59           line           mouth at           [place]



               00   :00            placement           bedtime           Medical



                                                  for 180           Branch



                                                  days.           

 

     atorvastati      2020      Yes       Essential 80mg      Take 1          

 [facility]



     n 80 mg      2-17                hypertensio           tablet by           

ity of



     tablet      00:00:                n              mouth at           [place]



               00                                 bedtime.           Medical



                                                                 Branch

 

     atorvastati      2020      Yes       [id number] 80mg      Take 1           

Univers



     n 80 mg      2-17                               tablet by           ity of



     tablet      00:00:                               mouth at           [place]



               00                                 bedtime.           Medical



                                                                 Branch

 

     magnesium      2020      Yes            400mg      Take 400           Uni

vers



     oxide 400      2-15                               mg by           ity of



     mg (241.3      00:00:                               mouth           [place]



     mg        00                                 daily.           Medical



     magnesium)                                                        Branch



     tablet                                                        

 

     magnesium      2020      Yes            400mg      Take 400           Uni

vers



     oxide 400      2-15                               mg by           ity of



     mg (241.3      00:00:                               mouth           [place]



     mg        00                                 daily.           Medical



     magnesium)                                                        Branch



     tablet                                                        

 

     metoprolol      2020      Yes       Status post 50mg      Take 1         

  Univers



     succinate                      left heart           tablet by          

 ity of



     XL 50 mg 24      00:00:                catheteriza           mouth 2       

    [place]



     hr tablet      00                  tion (LHC)           (two)           Med

ical



                                   by             times           Branch



                                   percutaneou           daily.           



                                   s approach                          

 

     SPIRONOLACT      2020-1      Yes       Essential           TAKE 1/2        

   Univers



     ONE 25 mg      1-06                hypertensio           TABLET BY         

  ity of



     tablet      00:00:                n              MOUTH           [place]



               00                                 EVERY DAY           Medical



                                                                 Branch

 

     Insulin      -0      Yes       Type 2           Use as           Univer

s



     Jarrell,      7-                diabetes           directed           ity

 of



     Disposable,      00:00:                mellitus           BId E11.65       

    [place]



     (PEN      00                  with                          Medical



     NEEDLE) 32                          peripheral                          Bra

nch



     gauge x                          neuropathy                          



     5/32" Ndle                                                        

 

     ferrous      -0      Yes       Anemia, 325mg      Take 1           Univ

ers



     sulfate 325      3-13                unspecified           tablet by       

    ity of



     mg (65 mg      00:00:                type           mouth 2           [place]



     iron)      00                                 (two)           Medical



     tablet                                         times           Branch



                                                  daily with           



                                                  meals.           

 

     ferrous      -0      Yes       [id number] 325mg      Take 1           Un

chris



     sulfate 325      3-13                               tablet by           ity

 of



     mg (65 mg      00:00:                               mouth 2           [place]



     iron)      00                                 (two)           Medical



     tablet                                         times           Branch



                                                  daily with           



                                                  meals.           

 

     nitroglycer            Yes            .4mg      Place 1           Uni

vers



     in 0.4 mg      4-02                               tablet           ity of



     sublingual      00:00:                               under the           [place]

[place]



     tablet      00                                 tongue           Medical



                                                  every 5           Branch



                                                  (five)           



                                                  minutes as           



                                                  needed for           



                                                  Chest           



                                                  pain.           

 

     nitroglycer            Yes            .4mg      Place 1           Uni

vers



     in 0.4 mg      4-02                               tablet           ity of



     sublingual      00:00:                               under the           [place]

[place]



     tablet      00                                 tongue           Medical



                                                  every 5           Branch



                                                  (five)           



                                                  minutes as           



                                                  needed for           



                                                  Chest           



                                                  pain.           







Immunizations







           Ordered    Filled Immunization Date       Status     Comments   [facility]

e



           Immunization Name Name                                        

 

           SARS-COV-2 COVID-19            2021 Completed             Unive

rsity of



           PFIZER VACCINE            00:00:00                         [facility]

 

           SARS-COV-2 COVID-19            2021 Completed             Unive

rsity of



           PFIZER VACCINE            00:00:00                         [facility]

 

           SARS-COV-2 COVID-19            2021 Completed             Unive

rsity of



           PFIZER VACCINE            00:00:00                         [facility]

 

           SARS-COV-2 COVID-19            2021 Completed             Unive

rsity of



           PFIZER VACCINE            00:00:00                         [facility]

 

           SARS-COV-2 COVID-19            2021 Completed             Unive

rsity of



           PFIZER VACCINE            00:00:00                         [facility]

 

           SARS-COV-2 COVID-19            2021 Completed             Unive

rsity of



           PFIZER VACCINE            00:00:00                         [facility]

 

           SARS-COV-2 COVID-19            2021 Completed             Unive

rsity of



           PFIZER VACCINE            00:00:00                         [facility]

 

           TDAP                  2020-08-10 Completed             University of



                                 00:00:00                         [facility]

 

           Pneumococcal            2020-08-10 Completed             University o

f



           Polysaccharide,            00:00:00                         [place] [facility]

ical



           PPSV23 (PNEUMOVAX)                                             Branch

 

           TDAP                  2020-08-10 Completed             University of



                                 00:00:00                         [facility]

 

           Pneumococcal            2020-08-10 Completed             University o

f



           Polysaccharide,            00:00:00                         [place] Med

ical



           PPSV23 (PNEUMOVAX)                                             Branch

 

           Influenza Virus            2019-10-24 Completed             Universit

y of



           Vaccine Quad .5 mL            00:00:00                         [facility]



           IM 6+ MO                                               Branch

 

           Influenza Virus            2019-10-24 Completed             Universit

y of



           Vaccine Quad .5 mL            00:00:00                         [facility]



           IM 6+ MO                                               [facility]

 

           Influenza Virus            2018 Completed             Universit

y of



           Vaccine Quad IM 3+            00:00:00                         [facility]

 

           Influenza Virus            2018 Completed             Universit

y of



           Vaccine Quad IM 3+            00:00:00                         [facility]

 

           TDAP                  2017 Completed             University of



                                 00:00:00                         [facility]

 

           TDAP                  2017 Completed             University of



                                 00:00:00                         [facility]

 

           Influenza Virus            2016 Completed             Universit

y of



           Vaccine               00:00:00                         [facility]

 

           Influenza Virus            2016 Completed             Universit

y of



           Vaccine (3+ yrs)            00:00:00                         [facility]

 

           Influenza Virus            2016 Completed             Universit

y of



           Vaccine               00:00:00                         [facility]

 

           Influenza Virus            2016 Completed             Universit

y of



           Vaccine (3+ yrs)            00:00:00                         [facility]

 

           Tetanus/Diptheria            2013-10-31 Completed             Univers

ity of



                                 00:00:00                         [facility]

 

           Tetanus/Diptheria            2013-10-31 Completed             Univers

ity of



                                 00:00:00                         [facility]

 

           Zoster(Zostavax)(            2013 Completed             Unive

rsity of



           ingles)               00:00:00                         [facility]

 

           Pneumococcal 13            2013 Completed             Universit

y of



           Conjugate, PCV13            00:00:00                         [place] [facility]

dical



           (Prevnar 13)                                             [facility]

 

           Zoster(Zostavax)(            2013 Completed             Unive

rsity of



           ingles)               00:00:00                         [facility]

 

           Pneumococcal 13            2013 Completed             Universit

y of



           Conjugate, PCV13            00:00:00                         [place] Me

dical



           (Prevnar 13)                                             Branch







Vital Signs







             Vital Name   Observation Time Observation Value Comments     Source

 

             Systolic blood 2022 16:40:00 144 mm[Hg]                Univer

sity of



             pressure                                            [facility]

 

             Diastolic blood 2022 16:40:00 80 mm[Hg]                 [facility]



             pressure                                            [facility]

 

             Heart rate   2022 16:33:00 65 /min                   [facility]

 

             Body temperature 2022 16:33:00 36.72 Alfreda                 [facility]

 

             Body height  2022 16:33:00 170.2 cm                  [facility]

 

             Body weight  2022 16:33:00 94.031 kg                 [facility]

 

             BMI          2022 16:33:00 32.47 kg/m2               [facility]

 

             Oxygen saturation in 2022 16:33:00 100 /min                  

[facility]



             Arterial blood by                                        [place] [facility]

shmuel



             Pulse oximetry                                        Branch







Procedures







                Procedure       Date / Time     Performing Clinician Source



                                Performed                       

 

                PHYSICIAN CORRESPONDENCE 2021 05:01:00 Doctor Unassigned, 

[facility]



                                                [place]         Medical Branch







Plan of Care







             Planned Activity Planned Date Details      Comments     Source

 

             Future Scheduled 2030-08-10   DTaP,Tdap,and Td              [facility]



             Test         00:00:00     Vaccines (3 - Td)              Medical Br

anch



                                       [code = DTaP,Tdap,and              



                                       Td Vaccines (3 - Td)]              

 

             Future Scheduled 2025-08-10   PNEUMOCOCCAL 0-64              Univer

[facility] [place]



             Test         00:00:00     YEARS COMBINED SERIES              Medica

l Branch



                                       (3 of 3 - PPSV23)              



                                       [code = PNEUMOCOCCAL              



                                       0-64 YEARS COMBINED              



                                       SERIES (3 of 3 -              



                                       PPSV23)]                  

 

             Future Scheduled 2022   [facility]inine [facility]              [facility]



             Test         00:00:00     (procedure) [code =              Medical 

Branch



                                       [id number]]                 

 

             Future Scheduled 2022   Diabetic foot              [facility]

 [facility] [place]



             Test         00:00:00     examination               Medical Branch



                                       (regime/therapy) [code              



                                       = 604041199]              

 

             Future Scheduled 2021   Depression screening              Uni

[facility]



             Test         00:00:00     (procedure) [code =              Medical 

Branch



                                       [id number]]                

 

             Future Scheduled 2021   [facility] VACCINE              [facility]

[facility] [place]



             Test         00:00:00     (Season Ended) [code =              Medic

al Branch



                                       INFLUENZA VACCINE              



                                       (Season Ended)]              

 

             Future Scheduled 2021   Hemoglobin A1c              [facility]



             Test         00:00:00     measurement               Medical Branch



                                       (procedure) [code =              



                                       66332874]                 

 

             Future Scheduled 2021   Screening for              [facility]

 [facility] [place]



             Test         00:00:00     malignant neoplasm of              Medica

l Branch



                                       colon (procedure)              



                                       [code = 812415838]              

 

             Future Scheduled 2021   Screening for              [facility]

 [facility] [place]



             Test         00:00:00     malignant neoplasm of              Medica

l Branch



                                       colon (procedure)              



                                       [code = 005481232]              

 

             Future Scheduled 2021   Calculated low density              U

[facility]



             Test         00:00:00     lipoprotein               Medical Branch



                                       cholesterol level              



                                       (procedure) [code =              



                                       548349676]                

 

             Future Scheduled 2021   Microalbumin              [facility] 

[facility] [place]



             Test         00:00:00     measurement, urine,              Medical 

Branch



                                       quantitative              



                                       (procedure) [code =              



                                       621489442]                

 

             Future Scheduled 2021   Examination of retina              Un

[facility]



             Test         00:00:00     (procedure) [code =              Medical 

Branch



                                       [id number]]                

 

             Future Scheduled 2013-08-15   Zoster Recombinant              [facility]

[facility]



             Test         00:00:00     Vaccine (SHINGRIX) (2              Medica

l Branch



                                       of 3) [code = Zoster              



                                       Recombinant Vaccine              



                                       (SHINGRIX) (2 of 3)]              

 

             Future Scheduled 2007-10-15   Screening for occult              Uni

[facility]



             Test         00:00:00     blood in feces              [facility] Branc

h



                                       (procedure) [code =              



                                       865933091]                

 

             Future Scheduled 2007-10-15   Stool DNA-based              [facility]

[facility] [place]



             Test         00:00:00     colorectal cancer              Medical Br

anch



                                       screening (procedure)              



                                       [code =                   



                                       [id number]]              

 

             Future Scheduled 2007-10-15   Flexible fiberoptic              [facility]



             Test         00:00:00     sigmoidoscopy              Medical Branch



                                       (procedure) [code =              



                                       41998524]                 







Encounters







        Start   End     Encounter Admission Attending Care    Care    Encounter 

Source



        Date/Time Date/Time Type    Type    Clinicians Facility Department ID   

   

 

        2022         Outpatient 3       Dov, LEONIEPL   AML     [id number]

21 [facility]



        12:22:33                         Linda                  0622    

 

        2022         Outpatient 3       [id number]  [facility]   REF     [phone]

 [facility]



        12:22:03                                                 0621    

 

        2021         Outpatient         BOLIVAR, [facility]     [id number] 

[place]



        02:58:24                         [facility]

 

        2022 Outpatient         HAILEY,   [facility]     [id number]

[address] [place]



        00:00:00 00:00:00                 KENDRA                  662     Method

i



                                                                        

 

        2022 Outpatient KIANA RUVALCABA, [facility]    [id number]

[id number] [facility]



        11:30:00 11:30:00                 LEMUEL                         ity [facility]

 

        2022 Outpatient         HAILEY,   [facility]     [id number]

[address] [place]



        00:00:00 00:00:00                 KENDRA                  7[address]     Method

i



                                                                        

 

        2022 Office          Enoc [facility] [phone] 

[id number] [facility]



        11:30:00 12:25:13 Visit           [facility] [id number]        

 ity of



                                                [facility] [id number]         [place]

[place]



                                                        [phone]         [address]

 

        2022 Outpatient         HAILEY,   [facility]     060     [id number]

[address] [place]



        00:00:00 00:00:00                 KENDRA                  [address]     Method

i



                                                                        

 

        2022 Outpatient         HAILEY,   [facility]     [id number]

[address] [place]



        00:00:00 00:00:00                 KENDRA                  207     Method

i



                                                                        

 

        2022 Outpatient         HAILEY,   [facility]     [id number]

[address] [place]



        00:00:00 00:00:00                 KENDRA                  384     Method

i



                                                                        

 

        2021-10-29 2021-10-29 Outpatient         HAILEY,   [facility]     021     [id number]

[address] [place]



        00:00:00 00:00:00                 KENDRA                  982     Method

i



                                                                        

 

        2021-10-25 2021-10-25 Outpatient         HAILEY,   [facility]     [id number]

[address] [place]



        00:00:00 00:00:00                 KENDRA                  968     Method

i



                                                                        

 

        2021-10-25 2021-10-25 Outpatient         HAILEY,   [facility]     [id number]

[address] [place]



        00:00:00 00:00:00                 KENDRA                  772     Method

i



                                                                        st

 

        2021-10-19 2021-10-19 Outpatient         HAILEY,   [facility]     [id number]

[address] [place]



        00:00:00 00:00:00                 KENDRA                  417     Method

i



                                                                        

 

        2021-10-19 2021-10-19 Outpatient         HAILEY,   [facility]     [id number]

[address] [place]



        00:00:00 00:00:00                 KENDRA                  752     Method

i



                                                                        

 

        2021-10-19 2021-10-19 Outpatient         HAILEY,   [facility]     [id number]

[address] [place]



        00:00:00 00:00:00                 KENDRA                  015     Method

i



                                                                        

 

        2021-10-04 2021-10-04 Outpatient         HAILEY,   [facility]     [id number]

[address] [place]



        00:00:00 00:00:00                 KENDRA                  [address]     Method

i



                                                                        

 

        2021 Outpatient         HAILEY,   [facility]     [id number]

[address] [place]



        00:00:00 00:00:00                 KENDRA                  [address]     Method

i



                                                                        

 

        2021 Office          B[place] [facility] [id number]

[address]



        09:57:11 10:13:20 Visit           Logan   AMADO  [id number]         He

tati



                                                PLAZA 1 [id number]         



                                                        [phone]         



                                                        2               







Results







           Test Description Test Time  Test Comments Results    Result Comments 

Source









                          SARS-CoV-2 (COVID-19) RNA [Presence] in Respiratory sp

ecimen by 2021-10-26 

02:02:12                                



                    VIDHYA with probe detection                     









                      Test Item  Value      Reference Range Interpretation Comme

nts









             SARS-CoV-2 (COVID-19) RNA [Presence] in Respiratory Not detected No

t-Detected              



             specimen by VIDHYA with probe detection (test code =                  

                      



             02935-3)                                            

 

             Whether patient is employed in a healthcare setting                

                        



             (test code = 41681-9)                                        

 

             Whether the patient has symptoms related to condition              

                          



             of interest (test code = 78574-5)                                  

      

 

             Patient was hospitalized because of this condition                 

                       



             (test code = 39832-4)                                        

 

             Whether the patient was admitted to intensive care unit            

                            



             (ICU) for condition of interest (test code = 76274-0)              

                          

 

             Whether patient resides in a congregate care setting               

                         



             (test code = 78699-8)

## 2022-08-08 NOTE — RAD REPORT
EXAM DESCRIPTION:  CTAbdomen   Pelvis Wo Contrast - 8/8/2022 4:30 pm

 

CLINICAL HISTORY:

abdominal pain

 

COMPARISON:  CT-STONE PROTOCOL dated 1/23/2012

 

TECHNIQUE:  CT of the abdomen and pelvis was performed.

 

All CT scans are performed using dose optimization technique as appropriate and may include automated
 exposure control or mA/KV adjustment according to patient size.

 

FINDINGS:  Lower chest: Small bilateral effusions coronary artery calcifications. Mild cardiomegaly. 
Mild nonspecific circumferential thickened distal esophagus. This could reflect esophagitis.

Liver: No acute abnormality or suspicious lesions.

Biliary: No biliary ductal dilatation.

Stomach: No significant focal abnormality.

Duodenum: No significant focal abnormality.

Pancreas: No significant abnormality.

Spleen: No significant abnormality.

Adrenal: No suspicious lesions.

Kidney/ureter: No hydronephrosis. No renal calculi.

Retroperitoneum: No retroperitoneal adenopathy.

Vascular: No aneurysm. Atherosclerosis.

Bowel: Diverticulosis. No evidence of acute diverticulitis. Normal appendix..

Peritoneum: No ascites or free air.

Bladder: Grossly unremarkable.

Reproductive: No adnexal masses.

Bones: No acute fracture.

Other: Diffuse skin thickening at the patient's pannus. Mild body wall edema.

 

IMPRESSION:  No acute intra-abdominal or pelvic finding.

 

Small pleural effusions and mild body wall edema may reflect anasarca.

## 2022-08-08 NOTE — RAD REPORT
EXAM DESCRIPTION:  CT - Head Brain Wo Cont - 8/8/2022 3:52 pm

 

CLINICAL HISTORY:  general weakness

 

COMPARISON:  No comparisons

 

TECHNIQUE:  All CT scans are performed using dose optimization technique as appropriate and may inclu
de automated exposure control or mA/KV adjustment according to patient size.

 

FINDINGS:  No intracranial hemorrhage, hydrocephalus or extra-axial fluid collection.No areas of brai
n edema or evidence of midline shift.

 

The paranasal sinuses and mastoids are clear. The calvarium is intact.

 

IMPRESSION:  No acute intracranial abnormality.

## 2022-08-08 NOTE — ER
Nurse's Notes                                                                                     

 [facility]                                                                 

Name: Milton Verdugo                                                                            

Age: 66 yrs                                                                                       

Sex: Male                                                                                         

: 1955                                                                                   

MRN: [id number]                                                                                   

Arrival Date: 2022                                                                          

Time: 14:02                                                                                       

Account#: [id number]                                                                            

Bed Treatment                                                                                     

Private MD: CLOEEN Chacon C                                                                            

Diagnosis: Weakness-general;Anemia in other chronic diseases classified elsewhere                 

                                                                                                  

Presentation:                                                                                     

                                                                                             

14:04 Chief complaint: Patient states: generalized weakness and shortness of breath x 3       kr3 

      weeks. Coronavirus screen: Vaccine status: Patient reports receiving the 2nd dose of        

      the covid vaccine. Client denies travel out of the [place] in the last 14 days. Ebola          

      Screen: Patient denies travel to an Ebola-affected area in the 21 days before illness       

      onset. No acute neurological deficit is noted. Pre-hospital glucose is not applicable       

      to this patient. Initial Sepsis Screen: Does the patient meet any 2 criteria? No.           

      Patient's initial sepsis screen is negative. Does the patient have a suspected source       

      of infection? No. Patient's initial sepsis screen is negative. Risk Assessment: Do you      

      want to hurt yourself or someone else? Patient reports no desire to harm self or            

      others. Onset of symptoms was July 15, 2022.                                                

14:04 Method Of Arrival: Ambulatory                                                           kr3 

14:04 Acuity: LEE 3                                                                           kr3 

                                                                                                  

Triage Assessment:                                                                                

14:10 The onset of the patients symptoms was more than six hours ago. General: Appears in no  kr3 

      apparent distress. uncomfortable, Behavior is calm, cooperative, appropriate for age.       

      Pain: Denies pain. Neuro:. Respiratory: Reports shortness of breath at rest.                

                                                                                                  

Stroke Activation: Symptom onset > 6 hours                                                        

 Physician: Stroke Attending; Name: ; Notified At: ; Arrived At:                                  

 Physician: Chief Stroke Resident; Name: ; Notified At: ; Arrived At:                             

 Physician: Stroke Resident; Name: ; Notified At: ; Arrived At:                                   

 Physician: ED Attending; Name: ; Notified At: ; Arrived At:                                      

 Physician: ED Resident; Name: ; Notified At: ; Arrived At:                                       

                                                                                                  

Historical:                                                                                       

- Allergies:                                                                                      

14:08 PENICILLINS;                                                                            kr3 

- PMHx:                                                                                           

14:08 Diabetes - NIDDM; High Cholesterol; Hypertension;                                       kr3 

- PSHx:                                                                                           

14:08 Coronary artery bypass graft;                                                           kr3 

                                                                                                  

- Immunization history:: Client reports receiving the 2nd dose of the Covid vaccine.              

- Social history:: Smoking status: Patient/guardian denies using tobacco, the patient             

  reports quitting approximately 35 years ago.                                                    

                                                                                                  

                                                                                                  

Screenin:39 Abuse screen: Denies threats or abuse. Nutritional screening: No deficits noted.        kb3 

      Tuberculosis screening: No symptoms or risk factors identified. Fall Risk No fall in        

      past 12 months (0 pts). Secondary diagnosis (15 points) impaired mobility, IV access        

      (20 points). Ambulatory Aid- None/Bed Rest/Nurse Assist (0 pts). Gait- Weak (10 pts.).      

      Mental Status- Oriented to own ability (0 pts). Total Patel Fall Scale indicates High       

      Risk Score (45 or more points). Fall prevention measures have been instituted. Frequent     

      Obs/Assessments Occuring Family Present and informed to notify staff if the need to         

      leave the bedside As available patient and family educated on Fall Prevention Program       

      and Strategies.                                                                             

                                                                                                  

Assessment:                                                                                       

14:38 VAN Scoring: Arm Drift: Patients demonstrates NO arm weakness. Patient is VAN Negative. kb3 

      Visual Disturbance: No visual disturbance noted. Aphasia: No aphasia noted. Neglect: No     

      neglect noted. The patient has not been NPO before screening. The patient is alert, and     

      able to follow commands. The patient does not exhibit slurred or garbled speech. The        

      patient is not exhibiting difficulty speaking. The patient is exhibiting difficulty         

      understanding words. The patient is able to swallow own secretions with no drooling or      

      need for suction. Patient tolerated one teaspoon of water. No drooling, immediate           

      coughing, gurgling, or clearing of the throat was noted. The patient tolerated 90mL of      

      water. No drooling, immediate coughing, gurgling, or clearing of the throat was noted.      

      The patient passed the bedside swallow screening. Oral medications may be given as          

      ordered. Contact Physician for further diet orders.                                         

14:41 General: Appears in no apparent distress. comfortable, Behavior is calm, cooperative,   kb3 

      Smells of Reports fatigue for Denies. Pain: Denies pain. Neuro: No deficits noted.          

      Level of Consciousness is awake, alert, obeys commands, Oriented to person, place,          

      time, situation,  are equal bilaterally Moves all extremities. Weakness Gait is        

      shuffling, Speech is normal, Facial symmetry appears normal, Pupils are PERRLA, Intact.     

      Cardiovascular: No deficits noted. Respiratory: Reports shortness of breath on exertion     

      Breath sounds are clear bilaterally. Onset: The symptoms/episode began/occurred             

      gradually. GI: Abdomen is round Last BM was 2022. Bowel sounds present X 4       

      quads. Abd is soft and non tender X 4 quads. Reports bloating. : No deficits noted.       

      Musculoskeletal: Reports Generalized weakness x3 weeks.                                     

14:45 TNKase (Tenecteplase) Screening: Indications: Contraindications: Patient reports onset  kb3 

      of signs and symptoms of stroke greater than 6 hours ago:.                                  

15:18 General:.                                                                               kb3 

16:08 Reassessment: Patient appears in no apparent distress at this time. Patient and/or      hb  

      family updated on plan of care and expected duration. Pain level reassessed. Patient is     

      alert, oriented x 3, equal unlabored respirations, skin warm/dry/pink.                      

17:15 Reassessment: SPO2 98% while ambulating in hallway on RA. PA Page notified. Patient     hb  

      denies pain at this time.                                                                   

19:10 Reassessment: Patient appears in no apparent distress at this time. Patient and/or      hb  

      family updated on plan of care and expected duration. Pain level reassessed. Patient is     

      alert, oriented x 3, equal unlabored respirations, skin warm/dry/pink.                      

                                                                                                  

Vital Signs:                                                                                      

14:04  / 64; Pulse 77; Resp 20; Temp 97.6; Pulse Ox 96% on R/A; Weight 90.72 kg; Height kr3 

      5 ft. 7 in. (170.18 cm); Pain 0/10;                                                         

14:38  / 62; Pulse 90; Resp 16; Pulse Ox 100% ; Pain 0/10;                              kb3 

16:58  / 50; Pulse 58; Resp 21; Pulse Ox 97% ; Pain 0/10;                               kb3 

17:32 Pulse 77; Pulse Ox 98% on R/A;                                                          dh3 

18:32  / 74; Pulse 51; Resp 16; Pulse Ox 94% ; Pain 0/10;                               kb3 

14:04 Body Mass Index 31.32 (90.72 kg, 170.18 cm)                                             kr3 

17:32 while ambulating                                                                        dh3 

                                                                                                  

Leno Coma Score:                                                                               

14:39 Eye Response: spontaneous(4). Verbal Response: oriented(5). Motor Response: obeys       kb3 

      commands(6). Total: 15.                                                                     

                                                                                                  

NIH Stroke Scale Scores:                                                                          

14:38 NIHSS Score: 0                                                                          kb3 

                                                                                                  

ED Course:                                                                                        

14:02 Patient arrived in ED.                                                                  mr  

14:02 COLEEN Chacon MD is Private Physician.                                                       mr  

14:08 Triage completed.                                                                       kr3 

14:12 Arm band placed on.                                                                     kr3 

14:23 Canelo Nj PA is PHCP.                                                                cp  

14:23 Felipe Smith MD is Attending Physician.                                                cp  

14:29 Kaylie Fay, RN is Primary Nurse.                                                   hb  

14:39 Patient has correct armband on for positive identification. Bed in low position. Call   kb3 

      light in reach. Side rails up X 1. Adult w/ patient.                                        

14:39 No provider procedures requiring assistance completed.                                  kb3 

14:51 XRAY Chest (1 view) In Process Unspecified.                                             EDMS

15:00 Basic Metabolic Panel Sent.                                                             kb3 

15:00 CBC with Diff Sent.                                                                     kb3 

15:00 LFT's Sent.                                                                             kb3 

15:00 Magnesium Sent.                                                                         kb3 

15:00 NT PRO-BNP Sent.                                                                        kb3 

15:00 PT-INR Sent.                                                                            kb3 

15:00 Troponin HS Sent.                                                                       kb3 

15:01 Inserted saline lock: 20 gauge in left forearm, using aseptic technique. Blood          kb3 

      collected.                                                                                  

15:53 CT Head Brain wo Cont In Process Unspecified.                                           EDMS

16:32 CT Abd/Pelvis - Without Contrast In Process Unspecified.                                EDMS

17:31 Pulse ox on. while ambulating 98% RA.                                                   dh3 

18:47 COLEEN Chacon MD is Referral Physician.                                                      cp  

19:10 IV discontinued, intact, bleeding controlled, No redness/swelling at site.              hb  

                                                                                                  

Administered Medications:                                                                         

15:42 CANCELLED (Physician Discretion): Lasix (furosemide) 40 mg IVP once; give over 2 minutescp  

16:11 Drug: Lasix (furosemide) 20 mg Route: IVP; Site: left wrist;                            hb  

                                                                                                  

                                                                                                  

Medication:                                                                                       

14:39 VIS not applicable for this client.                                                     kb3 

                                                                                                  

Outcome:                                                                                          

18:48 Discharge ordered by MD.                                                                cp  

19:10 Discharged to home via wheelchair, with family.                                         hb  

19:10 Condition: stable                                                                           

19:10 Discharge instructions given to patient, family, Instructed on discharge instructions,      

      follow up and referral plans. medication usage, Demonstrated understanding of               

      instructions, follow-up care, medications.                                                  

19:11 Patient left the ED.                                                                    hb  

                                                                                                  

                                                                                                  

NIH Stroke Scale - NIH Stroke Score                                                               

Date: 2022                                                                                  

Time: 14:38                                                                                       

Total Score = 0                                                                                   

  1a. Level of Consciousness (LOC) - 0(Alert)                                                     

  1b. Level of Consciousness (LOC) (Month \T\ Age) - 0(Both)                                      

  1c. LOC Commands (Open \T\ Closes Eyes/) - 0(Both)                                          

   2. Best Gaze (Lateral Gaze Paresis) - 0(Normal)                                                

   3. Visual Field Loss - 0(No visual loss)                                                       

   4. Facial Palsy - 0(Normal)                                                                    

  5a. Left Arm: Motor (10-second hold) - 0(No drift)                                              

  5b. Right Arm: Motor (10-second hold) - 0(No drift)                                             

  6a. Left Leg: Motor (5-second hold - always test supine) - 0(No drift)                          

  6b. Right Leg: Motor (5-second hold - always test supine) - 0(No drift)                         

   7. Limb Ataxia (finger/nose \T\ heel/shin - test with eyes open) - 0(Absent)                   

   8. Sensory Loss (pinprick arms/legs/face) - 0(Normal)                                          

   9. Best Language: Aphasia (description/naming/reading) - 0(No aphasia)                         

  10. Dysarthria (speech clarity - read or repeat words) - 0(Normal)                              

  11. Extinction and Inattention (visual/tactile/auditory/spatial/personal) - 0(No                

      abnormality)                                                                                

Initials: kb3                                                                                     

                                                                                                  

Signatures:                                                                                       

Dispatcher [employer]                           SHANNANMS                                                 

Zaid Jazz                                 mr                                                   

Canelo Nj PA PA cp Baxter, Heather, RN                     JESUS                                                      

Harini Jarrell                              3                                                  

Shari Wilson RN RN   kr3                                                  

Nurys Lebron RN                    RN   kb3                                                  

                                                                                                  

Corrections: (The following items were deleted from the chart)                                    

14:11 14:08 PMHx: Prostate; kr3                                                       kr3         

                                                                                                  

**************************************************************************************************

## 2022-08-08 NOTE — RAD REPORT
EXAM DESCRIPTION:  RAD - Chest Single View - 8/8/2022 2:50 pm

 

CLINICAL HISTORY:  SOB

 

COMPARISON:  Chest Single View dated 5/29/2022; Chest Single View dated 9/22/2017; CHEST PA AND LAT 2
 VIEW dated 3/4/2015

 

FINDINGS:  Lines: None.

Lungs: Increased prominence of the pulmonary vasculature.

Pleural: No significant pleural effusions or pneumothorax.

Cardiac: Cardiomegaly.

Bones: No acute fractures.

Other:

 

IMPRESSION:  Pulmonary vascular congestion. No alveolar edema or consolidative airspace disease.

## 2022-08-09 NOTE — EKG
Test Date:    2022-08-08               Test Time:    17:08:04

Technician:   HB                                     

                                                     

MEASUREMENT RESULTS:                                       

Intervals:                                           

Rate:         53                                     

ND:                                                  

QRSD:         80                                     

QT:           508                                    

QTc:          476                                    

Axis:                                                

P:                                                   

ND:                                                  

QRS:          37                                     

T:            -53                                    

                                                     

INTERPRETIVE STATEMENTS:                                       

                                                     

Atrial fibrillation with slow ventricular response

Possible Anterior infarct, age undetermined

Abnormal ECG

Compared to ECG 05/29/2022 17:02:21

Myocardial infarct finding now present

Atrial flutter no longer present

ST (T wave) deviation no longer present

Possible ischemia no longer present



Electronically Signed On 08-09-22 10:36:11 CDT by Royce Mario